# Patient Record
Sex: FEMALE | Race: WHITE | NOT HISPANIC OR LATINO | Employment: FULL TIME | URBAN - METROPOLITAN AREA
[De-identification: names, ages, dates, MRNs, and addresses within clinical notes are randomized per-mention and may not be internally consistent; named-entity substitution may affect disease eponyms.]

---

## 2017-01-24 ENCOUNTER — COMMUNICATION - HEALTHEAST (OUTPATIENT)
Dept: SCHEDULING | Facility: CLINIC | Age: 27
End: 2017-01-24

## 2017-01-24 DIAGNOSIS — L70.0 ACNE VULGARIS: ICD-10-CM

## 2017-01-24 DIAGNOSIS — Z30.41 ORAL CONTRACEPTIVE USE: ICD-10-CM

## 2017-01-26 ENCOUNTER — COMMUNICATION - HEALTHEAST (OUTPATIENT)
Dept: SCHEDULING | Facility: CLINIC | Age: 27
End: 2017-01-26

## 2017-01-26 DIAGNOSIS — Z30.41 ORAL CONTRACEPTIVE USE: ICD-10-CM

## 2017-01-27 ENCOUNTER — COMMUNICATION - HEALTHEAST (OUTPATIENT)
Dept: FAMILY MEDICINE | Facility: CLINIC | Age: 27
End: 2017-01-27

## 2017-01-27 DIAGNOSIS — L70.0 ACNE VULGARIS: ICD-10-CM

## 2017-05-30 ENCOUNTER — COMMUNICATION - HEALTHEAST (OUTPATIENT)
Dept: SCHEDULING | Facility: CLINIC | Age: 27
End: 2017-05-30

## 2017-05-30 DIAGNOSIS — L70.0 ACNE VULGARIS: ICD-10-CM

## 2017-11-07 ENCOUNTER — COMMUNICATION - HEALTHEAST (OUTPATIENT)
Dept: SCHEDULING | Facility: CLINIC | Age: 27
End: 2017-11-07

## 2017-11-07 DIAGNOSIS — L70.0 ACNE VULGARIS: ICD-10-CM

## 2017-12-04 ENCOUNTER — OFFICE VISIT - HEALTHEAST (OUTPATIENT)
Dept: FAMILY MEDICINE | Facility: CLINIC | Age: 27
End: 2017-12-04

## 2017-12-04 DIAGNOSIS — L70.0 ACNE VULGARIS: ICD-10-CM

## 2017-12-04 DIAGNOSIS — Z11.3 SCREEN FOR STD (SEXUALLY TRANSMITTED DISEASE): ICD-10-CM

## 2017-12-04 DIAGNOSIS — Z30.41 ORAL CONTRACEPTIVE USE: ICD-10-CM

## 2017-12-04 DIAGNOSIS — Z13.220 SCREENING CHOLESTEROL LEVEL: ICD-10-CM

## 2017-12-04 DIAGNOSIS — L70.8 OTHER ACNE: ICD-10-CM

## 2017-12-04 LAB — HIV 1+2 AB+HIV1 P24 AG SERPL QL IA: NEGATIVE

## 2017-12-05 LAB
HBV SURFACE AG SERPL QL IA: NEGATIVE
SYPHILIS RPR SCREEN - HISTORICAL: NORMAL

## 2018-03-06 ENCOUNTER — COMMUNICATION - HEALTHEAST (OUTPATIENT)
Dept: SCHEDULING | Facility: CLINIC | Age: 28
End: 2018-03-06

## 2018-03-06 DIAGNOSIS — L70.0 ACNE VULGARIS: ICD-10-CM

## 2019-05-23 ENCOUNTER — AMBULATORY - HEALTHEAST (OUTPATIENT)
Dept: MULTI SPECIALTY CLINIC | Facility: CLINIC | Age: 29
End: 2019-05-23

## 2019-05-23 LAB — PAP SMEAR - HIM PATIENT REPORTED: NORMAL

## 2019-08-15 ENCOUNTER — OFFICE VISIT - HEALTHEAST (OUTPATIENT)
Dept: FAMILY MEDICINE | Facility: CLINIC | Age: 29
End: 2019-08-15

## 2019-08-15 DIAGNOSIS — R10.2 PELVIC PAIN: ICD-10-CM

## 2019-08-15 LAB
ALBUMIN UR-MCNC: NEGATIVE MG/DL
APPEARANCE UR: CLEAR
BACTERIA #/AREA URNS HPF: ABNORMAL HPF
BILIRUB UR QL STRIP: NEGATIVE
COLOR UR AUTO: YELLOW
GLUCOSE UR STRIP-MCNC: NEGATIVE MG/DL
HGB UR QL STRIP: ABNORMAL
KETONES UR STRIP-MCNC: NEGATIVE MG/DL
LEUKOCYTE ESTERASE UR QL STRIP: NEGATIVE
NITRATE UR QL: NEGATIVE
PH UR STRIP: 5.5 [PH] (ref 5–8)
RBC #/AREA URNS AUTO: ABNORMAL HPF
SP GR UR STRIP: >=1.03 (ref 1–1.03)
SQUAMOUS #/AREA URNS AUTO: ABNORMAL LPF
UROBILINOGEN UR STRIP-ACNC: ABNORMAL
WBC #/AREA URNS AUTO: ABNORMAL HPF

## 2019-08-15 ASSESSMENT — MIFFLIN-ST. JEOR: SCORE: 1364.31

## 2019-08-16 ENCOUNTER — HOSPITAL ENCOUNTER (OUTPATIENT)
Dept: ULTRASOUND IMAGING | Facility: CLINIC | Age: 29
Discharge: HOME | End: 2019-08-16
Attending: FAMILY MEDICINE

## 2019-08-16 DIAGNOSIS — R10.2 PELVIC PAIN: ICD-10-CM

## 2019-08-17 LAB — BACTERIA SPEC CULT: NO GROWTH

## 2019-08-22 ENCOUNTER — COMMUNICATION - HEALTHEAST (OUTPATIENT)
Dept: FAMILY MEDICINE | Facility: CLINIC | Age: 29
End: 2019-08-22

## 2019-09-12 ENCOUNTER — OFFICE VISIT (OUTPATIENT)
Dept: FAMILY MEDICINE | Facility: CLINIC | Age: 29
End: 2019-09-12
Payer: COMMERCIAL

## 2019-09-12 VITALS — HEART RATE: 87 BPM | TEMPERATURE: 97.5 F | SYSTOLIC BLOOD PRESSURE: 147 MMHG | DIASTOLIC BLOOD PRESSURE: 95 MMHG

## 2019-09-12 DIAGNOSIS — Z71.84 TRAVEL ADVICE ENCOUNTER: Primary | ICD-10-CM

## 2019-09-12 PROCEDURE — 90471 IMMUNIZATION ADMIN: CPT | Mod: GA | Performed by: NURSE PRACTITIONER

## 2019-09-12 PROCEDURE — 90715 TDAP VACCINE 7 YRS/> IM: CPT | Mod: GA | Performed by: NURSE PRACTITIONER

## 2019-09-12 PROCEDURE — 90717 YELLOW FEVER VACCINE SUBQ: CPT | Mod: GA | Performed by: NURSE PRACTITIONER

## 2019-09-12 PROCEDURE — 99402 PREV MED CNSL INDIV APPRX 30: CPT | Mod: 25 | Performed by: NURSE PRACTITIONER

## 2019-09-12 PROCEDURE — 90691 TYPHOID VACCINE IM: CPT | Mod: GA | Performed by: NURSE PRACTITIONER

## 2019-09-12 PROCEDURE — 90734 MENACWYD/MENACWYCRM VACC IM: CPT | Mod: GA | Performed by: NURSE PRACTITIONER

## 2019-09-12 PROCEDURE — 90472 IMMUNIZATION ADMIN EACH ADD: CPT | Mod: GA | Performed by: NURSE PRACTITIONER

## 2019-09-12 RX ORDER — ATOVAQUONE AND PROGUANIL HYDROCHLORIDE 250; 100 MG/1; MG/1
1 TABLET, FILM COATED ORAL DAILY
Qty: 22 TABLET | Refills: 0 | Status: SHIPPED | OUTPATIENT
Start: 2019-09-12 | End: 2021-11-23

## 2019-09-12 RX ORDER — ATOVAQUONE AND PROGUANIL HYDROCHLORIDE 250; 100 MG/1; MG/1
1 TABLET, FILM COATED ORAL DAILY
Qty: 22 TABLET | Refills: 0 | Status: SHIPPED | OUTPATIENT
Start: 2019-09-12 | End: 2019-09-17

## 2019-09-12 RX ORDER — AZITHROMYCIN 500 MG/1
500 TABLET, FILM COATED ORAL DAILY
Qty: 3 TABLET | Refills: 0 | Status: SHIPPED | OUTPATIENT
Start: 2019-09-12 | End: 2019-09-15

## 2019-09-12 RX ORDER — CLINDAMYCIN PHOSPHATE 10 MG/G
GEL TOPICAL
COMMUNITY
Start: 2017-12-04 | End: 2021-11-23

## 2019-09-12 RX ORDER — LEVONORGESTREL/ETHIN.ESTRADIOL 0.1-0.02MG
1 TABLET ORAL
COMMUNITY
Start: 2019-05-23 | End: 2021-11-23

## 2019-09-12 NOTE — PROGRESS NOTES
Nurse Note      Itinerary:  [facility]         Departure Date: 12/29/2019      Return Date: 1/9/2019      Length of Trip 10-11 days      Reason for Travel: Tourism           Urban or rural: Rural      Accommodations: Hotel        IMMUNIZATION HISTORY  Have you received any immunizations within the past 4 weeks?  No  Have you ever fainted from having your blood drawn or from an injection?  No  Have you ever had a fever reaction to vaccination?  No  Have you ever had any bad reaction or side effect from any vaccination?  No  Have you ever had hepatitis A or B vaccine?  Yes  Do you live (or work closely) with anyone who has AIDS, an AIDS-like condition, any other immune disorder or who is on chemotherapy for cancer?  No  Do you have a family history of immunodeficiency?  No  Have you received any injection of immune globulin or any blood products during the past 12 months?  No    Patient roomed by JUHI Thomas  Iwona Hill is a 29 year old female seen today with spouse for counsultation for international travel to the stated countries.   Patient will be departing in  3.5 month(s) and  traveling with spouse.      Patient itinerary :  will be in the urban region of    which presents risk for Malaria and Yellow Fever. exposure.      Patient's activities will include sightseeing.    Patient's country of birth is [place]    Special medical concerns: none  Pre-travel questionnaire was completed by patient and reviewed by provider.     Vitals: BP (!) 147/95   Pulse 87   Temp 97.5  F (36.4  C) (Oral)   BMI= There is no height or weight on file to calculate BMI.    EXAM:  General:  Well-nourished, well-developed in no acute distress.  Appears to be stated age, interacts appropriately and expresses understanding of information given to patient.    Current Outpatient Medications   Medication Sig Dispense Refill     clindamycin (CLINDAMAX) 1 % external gel        levonorgestrel-ethinyl estradiol  (AVIANE/ALESSE/LESSINA) 0.1-20 MG-MCG tablet Take 1 tablet by mouth       There is no problem list on file for this patient.    No Known Allergies      Immunizations discussed include:   Hepatitis A:  Up to date  Hepatitis B: Up to date per report  Influenza: deferred  Typhoid: Ordered/given today, risks, benefits and side effects reviewed  Rabies: Declined  reviewed managment of a animal bite or scratch (washing wound, seek medical care within 24 hours for post exposure prophylaxis )  Yellow Fever: Stamaril Ordered/given today - consent completed, side effects, precautions, allergies, risks discussed. Patient expressed understanding.  Greenlandic Encephalitis: Not indicated  Meningococcus: Ordered/given today, risks, benefits and side effects reviewed  Tetanus/Diphtheria: Ordered/given today, risks, benefits and side effects reviewed  Measles/Mumps/Rubella: Up to date  Cholera: Not needed  Polio: Up to date  Pneumococcal: Under age of 65  Varicella: Up to date  Zostavax:  Not indicated  Shingrix: Not indicated  HPV:  Not indicated  TB:  Low risk     Stamaril Informed Consent    The patient was provided with a copy of the IRB-approved consent form and all questions were answered before the patient agreed to participate by signing the informed consent document.   A copy of the form was provided to the patient.    Date: 09/12/2019  Consent Version Date: 5/10/2017   Consent Obtained by:  Aspen Salcedo CNP (Lori)     HIPAA:  Yes  HIPAA Authorization Signed Date: 09/12/2019      Inclusion/Exclusion Criteria:    (Similar to Yellow Fever-VAX)      The patient met all of the following inclusion criteria in order to be eligible for the Stamaril vaccination under this EAP (Expanded Access Investigational New Drug Program)           At increased risk for YF, including researchers, laboratory workers, vaccine production staff, and those who are traveling within 30 days to a YF-endemic region or to a country requiring proof of YF  vaccination under IHRs (International Health Regulations)?       Yes     Patient is greater than or equal to 9 months of age on the day of vaccination?     Yes     Patient is greater than or equal to 18 years of age and signed and dated the Consent Forms?     Yes     Patient is < 18 years of age and parent(s)/guardian(s) signed and dated the Consent Forms?      Patient is 7 years to < 18 years of age and signed and dated the Assent form?        No Assent is required.  Patient is <7 years of age.     No      No      N/A     The patient did not meet any of the following criteria that would have excluded the patient from receiving the Stamaril vaccination under this EAP              Patient is less than 9 months of age.       No     The patient is breast-feeding and cannot stop nursing for at least 14 days after vaccination.    Note: Yellow Fever vaccine virus may be transmissible via breast milk by nursing mothers who are vaccinated during the final 2 weeks of pregnancy or post-partum.   Following transmission, infants may develop encephalitis.  The minimum time of discontinuation of breastfeeding for 14 days after vaccination is based on the expected clearance of live-attenuated vaccine virus.       No     The patient is immunosuppressed, whether congenital or idiopathic, including for example, leukemia, lymphoma, other malignancies, and patients who are receiving immunosuppressant medications (e.g. Systemic corticosteroids [greater than the standard dose of topical or inhaled steroids], alkylating drugs, antimetabolites, of other cytotoxic or immunomodulatory drugs) or radiation therapy or organ transplantation.       No     The patient has known hypersensitivity to the active substance or to any of the excipients of Stamaril vaccine or to eggs or chicken proteins.     No     The patient is symptomatic for human immunodeficiency virus (HIV) infection     No     The patient is asymptomatic for HIV infection but  accompanied by evidence of severe immune suppression    Note:  Evidence of severe immune suppression includes CD4+ T-cell counts < 200 cubic millimeters (or < 15% total lymphocytes in children aged < 6 years), or as determined by the health care provider.       No     The patient has a history of thymus dysfunction (including myasthenia gravis, thymoma, thymectomy)     No     Moderate or severe febrile illness or acute illness    Note: Participation in the EAP can be reassessed when moderate or severe febrile illness or acute illness has resolved.       No           Altitude Exposure on this trip: no  Past tolerance to Altitude: na    ASSESSMENT/PLAN:    ICD-10-CM    1. Travel advice encounter Z71.89 azithromycin (ZITHROMAX) 500 MG tablet     atovaquone-proguanil (MALARONE) 250-100 MG tablet     azithromycin (ZITHROMAX) 500 MG tablet     DISCONTINUED: atovaquone-proguanil (MALARONE) 250-100 MG tablet     I have reviewed general recommendations for safe travel   including: food/water precautions, insect precautions, safer sex   practices given high prevalence of Zika, HIV and other STDs,   roadway safety. Educational materials and [employer] report provided.    Malaraia prophylaxis recommended: Malarone  Symptomatic treatment for traveler's diarrhea: azithromycin  Altitude illness prevention and treatment: none      Evacuation insurance advised and resources were provided to patient.    Total visit time 30 minutes  with over 50% of time spent counseling patient as detailed above.    Aspen Salcedo CNP

## 2019-09-12 NOTE — PATIENT INSTRUCTIONS
Today September 12, 2019 you received the    Yellow Fever (YF)    Tetanus (Tdap) Vaccine    Meningococcal (Menactra) Vaccine    Typhoid - injectable. This vaccine is valid for two years.   .    These appointments can be made as a NURSE ONLY visit.    **It is very important for the vaccinations to be given on the scheduled day(s), this helps ensure you receive the full effectiveness of the vaccine.**    Please call [facility] with any questions [phone]    Thank you for visiting [facility]

## 2020-01-29 NOTE — TELEPHONE ENCOUNTER
RECORDS RECEIVED FROM: Self   Date of Appt: 2/27/20   NOTES (FOR ALL VISITS) STATUS DETAILS   OFFICE NOTE from referring provider N/A    OFFICE NOTE from other specialist Care Everywhere Yeny BOJORQUEZ @ [facility]:  10/28/19   DISCHARGE SUMMARY from hospital N/A    DISCHARGE REPORT from the ER N/A    OPERATIVE REPORT N/A    MEDICATION LIST Care Everywhere    IMAGING  (FOR ALL VISITS)     EMG N/A    EEG N/A    MRI (HEAD, NECK, SPINE) N/A    LUMBAR PUNCTURE N/A    LANCE Scan N/A    CT (HEAD, NECK, SPINE) N/A

## 2020-02-27 ENCOUNTER — OFFICE VISIT (OUTPATIENT)
Dept: NEUROLOGY | Facility: CLINIC | Age: 30
End: 2020-02-27
Payer: COMMERCIAL

## 2020-02-27 ENCOUNTER — PRE VISIT (OUTPATIENT)
Dept: NEUROLOGY | Facility: CLINIC | Age: 30
End: 2020-02-27

## 2020-02-27 VITALS
WEIGHT: 148.4 LBS | SYSTOLIC BLOOD PRESSURE: 149 MMHG | OXYGEN SATURATION: 100 % | HEART RATE: 79 BPM | DIASTOLIC BLOOD PRESSURE: 91 MMHG

## 2020-02-27 DIAGNOSIS — G44.86 CERVICOGENIC HEADACHE: ICD-10-CM

## 2020-02-27 DIAGNOSIS — G44.209 TENSION HEADACHE: Primary | ICD-10-CM

## 2020-02-27 RX ORDER — GABAPENTIN 300 MG/1
300 CAPSULE ORAL AT BEDTIME
Qty: 90 CAPSULE | Refills: 1 | Status: SHIPPED | OUTPATIENT
Start: 2020-02-27 | End: 2021-11-23

## 2020-02-27 ASSESSMENT — ENCOUNTER SYMPTOMS
JOINT SWELLING: 0
DIZZINESS: 1
TREMORS: 0
NAUSEA: 0
ARTHRALGIAS: 0
FEVER: 0
ABDOMINAL PAIN: 1
TASTE DISTURBANCE: 0
DECREASED APPETITE: 0
JAUNDICE: 0
MUSCLE WEAKNESS: 0
LIGHT-HEADEDNESS: 1
NIGHT SWEATS: 0
SORE THROAT: 0
POLYPHAGIA: 0
HEADACHES: 1
HOARSE VOICE: 0
INSOMNIA: 1
SLEEP DISTURBANCES DUE TO BREATHING: 0
DISTURBANCES IN COORDINATION: 0
ORTHOPNEA: 0
DEPRESSION: 1
MEMORY LOSS: 0
PALPITATIONS: 0
SMELL DISTURBANCE: 0
SEIZURES: 0
POLYDIPSIA: 0
MYALGIAS: 0
STIFFNESS: 0
LEG PAIN: 0
HYPOTENSION: 0
WEIGHT GAIN: 0
CHILLS: 0
BACK PAIN: 1
TINGLING: 1
RECTAL PAIN: 0
INCREASED ENERGY: 1
PARALYSIS: 0
SPEECH CHANGE: 0
BLOOD IN STOOL: 1
BOWEL INCONTINENCE: 0
VOMITING: 0
EXERCISE INTOLERANCE: 0
CONSTIPATION: 1
NECK MASS: 0
NUMBNESS: 0
HALLUCINATIONS: 0
BLOATING: 0
LOSS OF CONSCIOUSNESS: 0
TROUBLE SWALLOWING: 0
DIARRHEA: 0
DECREASED CONCENTRATION: 0
SINUS CONGESTION: 0
HYPERTENSION: 1
SYNCOPE: 0
WEAKNESS: 0
WEIGHT LOSS: 0
SINUS PAIN: 0
FATIGUE: 1
HEARTBURN: 0
ALTERED TEMPERATURE REGULATION: 0
NECK PAIN: 1
PANIC: 1
NERVOUS/ANXIOUS: 1
MUSCLE CRAMPS: 0

## 2020-02-27 ASSESSMENT — PAIN SCALES - GENERAL: PAINLEVEL: MODERATE PAIN (4)
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
## 2020-02-27 NOTE — NURSING NOTE
Chief Complaint   Patient presents with     Consult     [facility] NEW - RIGHT SIDE NECK PAIN; HEADACHES       Stan Casanova, EMT

## 2020-02-27 NOTE — PATIENT INSTRUCTIONS
Thanks for visiting the clinic today. Your recommendations are below.     1. Please be sure to schedule your brain MRI     2. Please start taking 300mg daily Gabapentin in the evening    3. Please be sure to start physical therapy.
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
## 2020-03-05 ENCOUNTER — THERAPY VISIT (OUTPATIENT)
Dept: PHYSICAL THERAPY | Facility: CLINIC | Age: 30
End: 2020-03-05
Attending: PSYCHIATRY & NEUROLOGY
Payer: COMMERCIAL

## 2020-03-05 ENCOUNTER — TELEPHONE (OUTPATIENT)
Dept: NEUROLOGY | Facility: CLINIC | Age: 30
End: 2020-03-05

## 2020-03-05 DIAGNOSIS — M54.2 NECK PAIN: Primary | ICD-10-CM

## 2020-03-05 DIAGNOSIS — G44.209 TENSION HEADACHE: ICD-10-CM

## 2020-03-05 PROCEDURE — 97110 THERAPEUTIC EXERCISES: CPT | Mod: GP | Performed by: PHYSICAL THERAPIST

## 2020-03-05 PROCEDURE — 97161 PT EVAL LOW COMPLEX 20 MIN: CPT | Mod: GP | Performed by: PHYSICAL THERAPIST

## 2020-03-05 NOTE — TELEPHONE ENCOUNTER
Pt called to ask if her MRI this Sunday was brain or brain and cervical spine. I clarified it was just for brain. She asked if cervical spine could be added. I let her know I will run this by the providers and get back to her.     Susannah VEGAS

## 2020-03-05 NOTE — PROGRESS NOTES
"[facility] Initial Evaluation  HPI  SUBJECTIVE  Iwona Hill is a 29 year old female patient presenting to Physical Therapy with the following Primary Symptoms: {RIGHT, LEFT-INITIAL CAP:952293} ***. {He/She:739718} denies having related symptoms spreading distally or proximally from the site of complaints. {He/She:492198} denies having severe night pain, peripheral motor deficit, changes in vision, ringing in the ears, painful swallowing, and worsening headaches/migraines.     History of symptoms: Headaches began to worsen around 6 weeks ago. ***. These pains are described as {constant or intermittent:917734}. Pain is rated ***/10 at rest, and ***/10 at worst. Patient describes pain as {PAIN quality:941509::\"cramping\"}.     Previous treatment has included {PREVIOUS TREATMENT Shoulder:695238}. Patient notes that prior treatment has resulted in ***. Since onset, these pains are {BETTER:550701071}.     Current Symptoms are worsened by: ***.     Current Symptoms are relieved by ***.     Patient states that current complaints {ARE:921877} affecting sleep pattern, with *** wakings per night and *** hours of sleep lost on average.     Recent surgical procedure: *** performed on {DATE/MONTH/YEAR:109891}.     Recent imaging studies {HAVE:195511} been performed and results showed: ***.     General health as reported by patient is: {EXCELLENT. GOOD. FAIR, POOR:618856}. Pertinent medical history: ***. {He/She:575993} denies any significant current illness or recent hospital admissions. {He/She:626051} denies any regonal implanted devices.     Current occupational status: ***. Occupational duties include: ***. Previous functional status: fully functional prior to pain onset/injury.    PATIENT GOALS: ***      OBJECTIVE    STATIC POSTURE: ***  -This patient {WAS / WAS NOT:262818} able to remain comfortably seated throughout exam.    TRANSFERS/TRANSITIONAL MOVEMENTS: ***      MOTOR CONTROL:  -Lower trap " "activation/endurance: ***  -Serratus activation: {QUALITY:9294903}  -Deep neck flexor activation/endurance: ***    ROM:  Flexion ***   Extension ***   Sidebend left ***   Sidebend right ***   Rotation left ***   Rotation right ***   *Denotes pain    OVERPRESSURE: ***      UE NEURO SCREEN    Myotomes:   Left Right   Shoulder shrug (C4) *** ***   Shoulder abduction (C5) *** ***   Elbow flexion (C6) *** ***   Elbow extension (C7) *** ***   Finger flexion (C8) *** ***   Finger adduction (T1) *** ***     Dermatomes: C4-T1 dermatomes {INTACT DIMINISHED:885965} to light touch bilaterally.    Reflexes: ***     strength: ***      SEGMENTAL MOBILITY:  -PA spring test: Patient was {RW PT CAPSULAR MOBILITY:202252::\"Normal\"} through segments ***.  -Downglide mobility: ***    PALPATION: ***      SHOULDER SCREEN:  -ROM: {RW PT CAPSULAR MOBILITY:888394::\"Normal\"}  -Other relevant findings: ***      Cervical Special Tests   Left Right   Sharp-Pursor test     Spurling's     ULTT - Ulnar nerve     ULTT - Median nerve     ULTT - Radial nerve     Cervical distraction     NT=Not Tested        REPEATED MOTIONS:  Motion Response (worse/no worse/better)   Retraction    Retraction x10    Retraction with extension    Retraction with extension x10        Therapist Assessment: Iwona Hill is a 29 year old female patient presenting to Physical Therapy with ***. Patient demonstrates ***. Signs and symptoms are consistent with ***. These impairments limit their ability to ***. Skilled PT services are necessary in order to reduce impairments and improve independent function.    System    Physical Exam    General     ROS    Assessment/Plan:    Patient is a 29 year old female with cervical complaints.    Patient has the following significant findings with corresponding treatment plan.                Diagnosis 1:  ***    {:074236}    Therapy Evaluation Codes:   1) History comprised of:   Personal factors that impact the plan of care:  "     {Personal factors impacting care:494577}.    Comorbidity factors that impact the plan of care are:      {Comorbidities impacting care:388683}.     Medications impacting care: {Medications Impacting care:225744}.  2) Examination of Body Systems comprised of:   Body structures and functions that impact the plan of care:      {Body Structures and functions:110957}.   Activity limitations that impact the plan of care are:      {Activity/participation limitations or restrictions:194431}.  3) Clinical presentation characteristics are:   {Clinical presentation characteristics:708768}.  4) Decision-Making    {Decision Making Low/Moderate/High:452480}  Cumulative Therapy Evaluation is: {Low/Moderate/High/Re-evaluation complexity:349065}.    Previous and current functional limitations:  (See Goal Flow Sheet for this information)    Short term and Long term goals: (See Goal Flow Sheet for this information)     Communication ability:  Patient appears to be able to clearly communicate and understand verbal and written communication and follow directions correctly.  Treatment Explanation - The following has been discussed with the patient:   RX ordered/plan of care  Anticipated outcomes  Possible risks and side effects  This patient would benefit from PT intervention to resume normal activities.   Rehab potential is {REHAB POTENTIAL/PROGNOSIS:163655}.    Frequency:  {FREQUENCY OF TREATMENT:023798}  Duration:  {DURATION OF TREATMENT:819763}  Discharge Plan:  Achieve all LTG.  Independent in home treatment program.  Reach maximal therapeutic benefit.    Please refer to the daily flowsheet for treatment today, total treatment time and time spent performing 1:1 timed codes.

## 2020-03-05 NOTE — TELEPHONE ENCOUNTER
[facility] Call Center    Phone Message    May a detailed message be left on voicemail: yes     Reason for Call: Other: pt has an MRI scheduled for this sunday, she is wondering if its just a scan of her brain, or her neck as well, please call pt thanks     Action Taken: Message routed to:  [facility] neuro    Travel Screening: Not Applicable

## 2020-03-06 ENCOUNTER — TELEPHONE (OUTPATIENT)
Dept: NEUROLOGY | Facility: CLINIC | Age: 30
End: 2020-03-06
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
## 2020-03-06 NOTE — TELEPHONE ENCOUNTER
"I called pt and left message answering question from yesterday regarding MRI. Per Dr. Rendon, \"Plan is just Brain MRI right now.  I know she has some neck pain as well but with exam without weakness, reflex asymmetry or sensory changes I do not see indication to order Cervical spine  MRI.  If she does not respond to treatment or physical therapy then could consider Cervical MRI on follow up, but suspect it is muscle tension for which MRI would not be helpful anyway.\"    Susannah VEGAS  "

## 2020-03-08 ENCOUNTER — ANCILLARY PROCEDURE (OUTPATIENT)
Dept: MRI IMAGING | Facility: CLINIC | Age: 30
End: 2020-03-08
Attending: PSYCHIATRY & NEUROLOGY
Payer: COMMERCIAL

## 2020-03-08 DIAGNOSIS — G44.209 TENSION HEADACHE: ICD-10-CM

## 2020-06-10 PROBLEM — G44.209 TENSION HEADACHE: Status: RESOLVED | Noted: 2020-03-05 | Resolved: 2020-06-10

## 2020-06-10 PROBLEM — M54.2 NECK PAIN: Status: RESOLVED | Noted: 2020-03-05 | Resolved: 2020-06-10

## 2020-06-10 NOTE — PROGRESS NOTES
DISCHARGE REPORT    Patient seen one time for evaluation and treat. Please see initial evaluation for discharge information. Episode to be closed at this time and patient formally discharged from therapy.      Donte Isaac, PT, DPT, OCS

## 2020-07-13 ENCOUNTER — COMMUNICATION - HEALTHEAST (OUTPATIENT)
Dept: FAMILY MEDICINE | Facility: CLINIC | Age: 30
End: 2020-07-13

## 2020-08-06 ENCOUNTER — PRENATAL OFFICE VISIT - HEALTHEAST (OUTPATIENT)
Dept: FAMILY MEDICINE | Facility: CLINIC | Age: 30
End: 2020-08-06

## 2020-08-06 ENCOUNTER — RECORDS - HEALTHEAST (OUTPATIENT)
Dept: ADMINISTRATIVE | Facility: OTHER | Age: 30
End: 2020-08-06

## 2020-08-06 DIAGNOSIS — Z3A.10 10 WEEKS GESTATION OF PREGNANCY: ICD-10-CM

## 2020-08-06 LAB
BASOPHILS # BLD AUTO: 0 THOU/UL (ref 0–0.2)
BASOPHILS NFR BLD AUTO: 0 % (ref 0–2)
EOSINOPHIL # BLD AUTO: 0.1 THOU/UL (ref 0–0.4)
EOSINOPHIL NFR BLD AUTO: 1 % (ref 0–6)
ERYTHROCYTE [DISTWIDTH] IN BLOOD BY AUTOMATED COUNT: 12.8 % (ref 11–14.5)
HCT VFR BLD AUTO: 36.8 % (ref 35–47)
HGB BLD-MCNC: 11.7 G/DL (ref 12–16)
HIV 1+2 AB+HIV1 P24 AG SERPL QL IA: NEGATIVE
LYMPHOCYTES # BLD AUTO: 2.3 THOU/UL (ref 0.8–4.4)
LYMPHOCYTES NFR BLD AUTO: 29 % (ref 20–40)
MCH RBC QN AUTO: 28.5 PG (ref 27–34)
MCHC RBC AUTO-ENTMCNC: 31.8 G/DL (ref 32–36)
MCV RBC AUTO: 90 FL (ref 80–100)
MONOCYTES # BLD AUTO: 0.5 THOU/UL (ref 0–0.9)
MONOCYTES NFR BLD AUTO: 6 % (ref 2–10)
NEUTROPHILS # BLD AUTO: 4.9 THOU/UL (ref 2–7.7)
NEUTROPHILS NFR BLD AUTO: 63 % (ref 50–70)
PLATELET # BLD AUTO: 168 THOU/UL (ref 140–440)
PMV BLD AUTO: 10.5 FL (ref 8.5–12.5)
RBC # BLD AUTO: 4.1 MILL/UL (ref 3.8–5.4)
WBC: 7.8 THOU/UL (ref 4–11)

## 2020-08-07 ENCOUNTER — AMBULATORY - HEALTHEAST (OUTPATIENT)
Dept: LAB | Facility: CLINIC | Age: 30
End: 2020-08-07

## 2020-08-07 ENCOUNTER — COMMUNICATION - HEALTHEAST (OUTPATIENT)
Dept: FAMILY MEDICINE | Facility: CLINIC | Age: 30
End: 2020-08-07

## 2020-08-07 DIAGNOSIS — Z3A.10 10 WEEKS GESTATION OF PREGNANCY: ICD-10-CM

## 2020-08-07 LAB
ABO/RH(D): NORMAL
ABORH REPEAT: NORMAL
ALBUMIN UR-MCNC: NEGATIVE MG/DL
ANTIBODY SCREEN: NEGATIVE
APPEARANCE UR: CLEAR
BILIRUB UR QL STRIP: NEGATIVE
C TRACH DNA SPEC QL PROBE+SIG AMP: NEGATIVE
COLOR UR AUTO: YELLOW
GLUCOSE UR STRIP-MCNC: NEGATIVE MG/DL
HBV SURFACE AG SERPL QL IA: NEGATIVE
HGB UR QL STRIP: NEGATIVE
KETONES UR STRIP-MCNC: NEGATIVE MG/DL
LEUKOCYTE ESTERASE UR QL STRIP: NEGATIVE
N GONORRHOEA DNA SPEC QL NAA+PROBE: NEGATIVE
NITRATE UR QL: NEGATIVE
PH UR STRIP: 6 [PH] (ref 5–8)
RUBV IGG SERPL QL IA: POSITIVE
SP GR UR STRIP: 1.01 (ref 1–1.03)
T PALLIDUM AB SER QL: NEGATIVE
UROBILINOGEN UR STRIP-ACNC: NORMAL

## 2020-08-08 LAB — BACTERIA SPEC CULT: NO GROWTH

## 2020-08-12 ENCOUNTER — COMMUNICATION - HEALTHEAST (OUTPATIENT)
Dept: FAMILY MEDICINE | Facility: CLINIC | Age: 30
End: 2020-08-12

## 2020-08-12 DIAGNOSIS — Z3A.10 10 WEEKS GESTATION OF PREGNANCY: ICD-10-CM

## 2020-08-19 ENCOUNTER — COMMUNICATION - HEALTHEAST (OUTPATIENT)
Dept: SCHEDULING | Facility: CLINIC | Age: 30
End: 2020-08-19

## 2020-08-19 ENCOUNTER — HOSPITAL ENCOUNTER (OUTPATIENT)
Dept: ULTRASOUND IMAGING | Facility: CLINIC | Age: 30
Discharge: HOME | End: 2020-08-19
Attending: FAMILY MEDICINE

## 2020-08-19 ENCOUNTER — PRENATAL OFFICE VISIT - HEALTHEAST (OUTPATIENT)
Dept: FAMILY MEDICINE | Facility: CLINIC | Age: 30
End: 2020-08-19

## 2020-08-19 DIAGNOSIS — O20.9 FIRST TRIMESTER BLEEDING: ICD-10-CM

## 2020-08-19 DIAGNOSIS — N93.9 VAGINAL SPOTTING: ICD-10-CM

## 2020-08-19 LAB
ALBUMIN UR-MCNC: NEGATIVE MG/DL
APPEARANCE UR: CLEAR
BILIRUB UR QL STRIP: NEGATIVE
COLOR UR AUTO: YELLOW
GLUCOSE UR STRIP-MCNC: NEGATIVE MG/DL
HGB UR QL STRIP: NEGATIVE
KETONES UR STRIP-MCNC: NEGATIVE MG/DL
LEUKOCYTE ESTERASE UR QL STRIP: NEGATIVE
NITRATE UR QL: NEGATIVE
PH UR STRIP: 5.5 [PH] (ref 5–8)
SP GR UR STRIP: >=1.03 (ref 1–1.03)
UROBILINOGEN UR STRIP-ACNC: NORMAL

## 2020-08-20 LAB — BACTERIA SPEC CULT: NO GROWTH

## 2020-09-08 ENCOUNTER — COMMUNICATION - HEALTHEAST (OUTPATIENT)
Dept: FAMILY MEDICINE | Facility: CLINIC | Age: 30
End: 2020-09-08

## 2020-09-17 ENCOUNTER — PRENATAL OFFICE VISIT - HEALTHEAST (OUTPATIENT)
Dept: FAMILY MEDICINE | Facility: CLINIC | Age: 30
End: 2020-09-17

## 2020-09-17 DIAGNOSIS — L70.0 ACNE VULGARIS: ICD-10-CM

## 2020-09-17 DIAGNOSIS — Z3A.16 16 WEEKS GESTATION OF PREGNANCY: ICD-10-CM

## 2020-09-20 LAB
# FETUSES US: NORMAL
AFP MOM SERPL: 1.75
AFP SERPL-MCNC: 62 NG/ML
AGE - REPORTED: 29 YR
CURRENT SMOKER: NO
FAMILY MEMBER DISEASES HX: NO
GA METHOD: NORMAL
GA: NORMAL WK
IDDM PATIENT QL: NO
INTEGRATED SCN PATIENT-IMP: NORMAL
SPECIMEN DRAWN SERPL: NORMAL

## 2020-10-14 ENCOUNTER — HOSPITAL ENCOUNTER (OUTPATIENT)
Dept: ULTRASOUND IMAGING | Facility: CLINIC | Age: 30
Discharge: HOME | End: 2020-10-14
Attending: FAMILY MEDICINE

## 2020-10-14 DIAGNOSIS — Z3A.10 10 WEEKS GESTATION OF PREGNANCY: ICD-10-CM

## 2020-10-19 ENCOUNTER — PRENATAL OFFICE VISIT - HEALTHEAST (OUTPATIENT)
Dept: FAMILY MEDICINE | Facility: CLINIC | Age: 30
End: 2020-10-19

## 2020-10-19 DIAGNOSIS — Z34.90 PREGNANCY, UNSPECIFIED GESTATIONAL AGE: ICD-10-CM

## 2020-11-12 ENCOUNTER — PRENATAL OFFICE VISIT - HEALTHEAST (OUTPATIENT)
Dept: FAMILY MEDICINE | Facility: CLINIC | Age: 30
End: 2020-11-12

## 2020-11-12 DIAGNOSIS — O44.40 LOW-LYING PLACENTA: ICD-10-CM

## 2020-11-18 ENCOUNTER — COMMUNICATION - HEALTHEAST (OUTPATIENT)
Dept: FAMILY MEDICINE | Facility: CLINIC | Age: 30
End: 2020-11-18

## 2020-12-03 ENCOUNTER — PRENATAL OFFICE VISIT - HEALTHEAST (OUTPATIENT)
Dept: FAMILY MEDICINE | Facility: CLINIC | Age: 30
End: 2020-12-03

## 2020-12-03 ENCOUNTER — COMMUNICATION - HEALTHEAST (OUTPATIENT)
Dept: SCHEDULING | Facility: CLINIC | Age: 30
End: 2020-12-03

## 2020-12-03 DIAGNOSIS — O23.42 URINARY TRACT INFECTION IN MOTHER DURING SECOND TRIMESTER OF PREGNANCY: ICD-10-CM

## 2020-12-04 ENCOUNTER — COMMUNICATION - HEALTHEAST (OUTPATIENT)
Dept: FAMILY MEDICINE | Facility: CLINIC | Age: 30
End: 2020-12-04

## 2020-12-04 ENCOUNTER — HOSPITAL ENCOUNTER (OUTPATIENT)
Dept: ULTRASOUND IMAGING | Facility: CLINIC | Age: 30
Discharge: HOME | End: 2020-12-04
Attending: FAMILY MEDICINE

## 2020-12-04 ENCOUNTER — AMBULATORY - HEALTHEAST (OUTPATIENT)
Dept: FAMILY MEDICINE | Facility: CLINIC | Age: 30
End: 2020-12-04

## 2020-12-04 ENCOUNTER — OFFICE VISIT - HEALTHEAST (OUTPATIENT)
Dept: FAMILY MEDICINE | Facility: CLINIC | Age: 30
End: 2020-12-04

## 2020-12-04 DIAGNOSIS — O44.40 LOW-LYING PLACENTA: ICD-10-CM

## 2020-12-04 DIAGNOSIS — O23.10 ACUTE CYSTITIS IN PREGNANCY, ANTEPARTUM: ICD-10-CM

## 2020-12-10 ENCOUNTER — PRENATAL OFFICE VISIT - HEALTHEAST (OUTPATIENT)
Dept: FAMILY MEDICINE | Facility: CLINIC | Age: 30
End: 2020-12-10

## 2020-12-10 DIAGNOSIS — Z3A.28 28 WEEKS GESTATION OF PREGNANCY: ICD-10-CM

## 2020-12-10 LAB
FASTING STATUS PATIENT QL REPORTED: NO
GLUCOSE 1H P 50 G GLC PO SERPL-MCNC: 80 MG/DL (ref 70–139)
HGB BLD-MCNC: 11.5 G/DL (ref 12–16)

## 2021-01-05 ENCOUNTER — PRENATAL OFFICE VISIT - HEALTHEAST (OUTPATIENT)
Dept: FAMILY MEDICINE | Facility: CLINIC | Age: 31
End: 2021-01-05

## 2021-01-05 DIAGNOSIS — N64.52 BLOODY DISCHARGE FROM LEFT NIPPLE: ICD-10-CM

## 2021-01-12 ENCOUNTER — RECORDS - HEALTHEAST (OUTPATIENT)
Dept: ULTRASOUND IMAGING | Facility: CLINIC | Age: 31
End: 2021-01-12

## 2021-01-12 DIAGNOSIS — N64.52 NIPPLE DISCHARGE: ICD-10-CM

## 2021-01-14 ENCOUNTER — PRENATAL OFFICE VISIT - HEALTHEAST (OUTPATIENT)
Dept: FAMILY MEDICINE | Facility: CLINIC | Age: 31
End: 2021-01-14

## 2021-01-14 DIAGNOSIS — Z3A.33 33 WEEKS GESTATION OF PREGNANCY: ICD-10-CM

## 2021-01-27 ENCOUNTER — COMMUNICATION - HEALTHEAST (OUTPATIENT)
Dept: FAMILY MEDICINE | Facility: CLINIC | Age: 31
End: 2021-01-27

## 2021-01-28 ENCOUNTER — PRENATAL OFFICE VISIT - HEALTHEAST (OUTPATIENT)
Dept: FAMILY MEDICINE | Facility: CLINIC | Age: 31
End: 2021-01-28

## 2021-01-28 DIAGNOSIS — Z3A.35 35 WEEKS GESTATION OF PREGNANCY: ICD-10-CM

## 2021-01-29 ENCOUNTER — AMBULATORY - HEALTHEAST (OUTPATIENT)
Dept: FAMILY MEDICINE | Facility: CLINIC | Age: 31
End: 2021-01-29

## 2021-01-29 DIAGNOSIS — N64.52 BLOODY DISCHARGE FROM LEFT NIPPLE: ICD-10-CM

## 2021-02-11 ENCOUNTER — PRENATAL OFFICE VISIT - HEALTHEAST (OUTPATIENT)
Dept: FAMILY MEDICINE | Facility: CLINIC | Age: 31
End: 2021-02-11

## 2021-02-11 DIAGNOSIS — Z3A.36 36 WEEKS GESTATION OF PREGNANCY: ICD-10-CM

## 2021-02-12 LAB
ALLERGIC TO PENICILLIN: NO
GP B STREP DNA SPEC QL NAA+PROBE: NEGATIVE

## 2021-02-15 ENCOUNTER — RECORDS - HEALTHEAST (OUTPATIENT)
Dept: ADMINISTRATIVE | Facility: OTHER | Age: 31
End: 2021-02-15

## 2021-02-18 ENCOUNTER — PRENATAL OFFICE VISIT - HEALTHEAST (OUTPATIENT)
Dept: FAMILY MEDICINE | Facility: CLINIC | Age: 31
End: 2021-02-18

## 2021-02-18 DIAGNOSIS — L70.0 ACNE VULGARIS: ICD-10-CM

## 2021-02-25 ENCOUNTER — PRENATAL OFFICE VISIT - HEALTHEAST (OUTPATIENT)
Dept: FAMILY MEDICINE | Facility: CLINIC | Age: 31
End: 2021-02-25

## 2021-02-25 DIAGNOSIS — Z34.90 PREGNANCY, UNSPECIFIED GESTATIONAL AGE: ICD-10-CM

## 2021-03-01 ENCOUNTER — HOSPITAL ENCOUNTER (OUTPATIENT)
Dept: OBGYN | Facility: HOSPITAL | Age: 31
Discharge: HOME | End: 2021-03-01
Attending: FAMILY MEDICINE | Admitting: FAMILY MEDICINE

## 2021-03-01 ENCOUNTER — COMMUNICATION - HEALTHEAST (OUTPATIENT)
Dept: FAMILY MEDICINE | Facility: CLINIC | Age: 31
End: 2021-03-01

## 2021-03-01 LAB — RUPTURE OF FETAL MEMBRANES BY ROM PLUS: NEGATIVE

## 2021-03-01 ASSESSMENT — MIFFLIN-ST. JEOR: SCORE: 1551.42

## 2021-03-03 ENCOUNTER — COMMUNICATION - HEALTHEAST (OUTPATIENT)
Dept: OBGYN | Facility: CLINIC | Age: 31
End: 2021-03-03

## 2021-03-03 ASSESSMENT — MIFFLIN-ST. JEOR: SCORE: 1551.42

## 2021-03-04 ENCOUNTER — SURGERY - HEALTHEAST (OUTPATIENT)
Dept: OBGYN | Facility: CLINIC | Age: 31
End: 2021-03-04

## 2021-03-04 ENCOUNTER — COMMUNICATION - HEALTHEAST (OUTPATIENT)
Dept: SCHEDULING | Facility: CLINIC | Age: 31
End: 2021-03-04

## 2021-03-04 ENCOUNTER — ANESTHESIA - HEALTHEAST (OUTPATIENT)
Dept: OBGYN | Facility: CLINIC | Age: 31
End: 2021-03-04

## 2021-03-06 ENCOUNTER — COMMUNICATION - HEALTHEAST (OUTPATIENT)
Dept: SCHEDULING | Facility: CLINIC | Age: 31
End: 2021-03-06

## 2021-03-19 ENCOUNTER — OFFICE VISIT - HEALTHEAST (OUTPATIENT)
Dept: FAMILY MEDICINE | Facility: CLINIC | Age: 31
End: 2021-03-19

## 2021-03-19 DIAGNOSIS — N61.0 MASTITIS: ICD-10-CM

## 2021-04-15 ENCOUNTER — OFFICE VISIT - HEALTHEAST (OUTPATIENT)
Dept: FAMILY MEDICINE | Facility: CLINIC | Age: 31
End: 2021-04-15

## 2021-04-15 DIAGNOSIS — Z30.011 BCP (BIRTH CONTROL PILLS) INITIATION: ICD-10-CM

## 2021-04-15 ASSESSMENT — MIFFLIN-ST. JEOR: SCORE: 1420.16

## 2021-04-15 ASSESSMENT — EDINBURGH POSTNATAL DEPRESSION SCALE (EPDS): TOTAL SCORE: 3

## 2021-04-16 LAB
BKR LAB AP ABNORMAL BLEEDING: NO
BKR LAB AP BIRTH CONTROL/HORMONES: NORMAL
BKR LAB AP CERVICAL APPEARANCE: NORMAL
BKR LAB AP GYN ADEQUACY: NORMAL
BKR LAB AP GYN INTERPRETATION: NORMAL
BKR LAB AP HPV REFLEX: NORMAL
BKR LAB AP LMP: NORMAL
BKR LAB AP PATIENT STATUS: NORMAL
BKR LAB AP PREVIOUS ABNORMAL: NO
BKR LAB AP PREVIOUS NORMAL: 2016
HIGH RISK?: NO
PATH REPORT.COMMENTS IMP SPEC: NORMAL
RESULT FLAG (HE HISTORICAL CONVERSION): NORMAL

## 2021-05-10 ENCOUNTER — COMMUNICATION - HEALTHEAST (OUTPATIENT)
Dept: FAMILY MEDICINE | Facility: CLINIC | Age: 31
End: 2021-05-10

## 2021-05-31 VITALS — WEIGHT: 138.2 LBS | BODY MASS INDEX: 21.97 KG/M2

## 2021-05-31 NOTE — PROGRESS NOTES
Please speak to pt(no VM) and tell her sono of pelvis normal.  May represent endometriosis and I'd like her to visit with her OB in [place], [place].

## 2021-05-31 NOTE — PROGRESS NOTES
"Chief Complaint   Patient presents with     Abdominal Pain     Pt c/o L side abdominal pain, Seen in ED in July.     Diarrhea     Pt c/o diarrhea x 3 days       HPI: 29-year-old female, with a past medical history of parotid gland infections and tonsillectomy, presents today for evaluation of lower pelvic pain.  She states that she has had mild left-sided pelvic pain since July.  Review of old notes from 2019 shows that she was seen in the [facility] emergency room, CT scan of the abdomen was performed showing only mild nodules but no evidence of acute process.  White count was 10,900 and she had a strong positive UA and was treated with Keflex.  Since that time she has not had symptoms but she did not have symptoms prior to coming to the emergency room either.  Her symptoms are somewhat vague.  She is currently menstruating.  She takes oral contraceptives for birth control.  She notes occasional mild diarrhea.  Last Pap smear was 2 months ago and was described as normal.  She is a  0 para 0.    ROS: 10 point system review notable for tonsillitis, occasional headaches and dizziness and diarrhea.    SH:     She does not smoke and she is  she works as a .  She does not use illicit drugs.     FH: The Patient's family history is not on file.     Meds:  Shannon has a current medication list which includes the following prescription(s): clindamycin.    O:  /64   Pulse 75   Temp 98  F (36.7  C)   Resp 16   Ht 5' 5.5\" (1.664 m)   Wt 139 lb (63 kg)   SpO2 100%   BMI 22.78 kg/m    Constitutional:    --Vitals as above  --No acute distress  Eyes-  --Sclera noninjected  --Lids and conjunctiva normal  ENT-  --TMs clear  --Sclera noninjected  --Pharynx not erythematous  Neck-  --Neck supple with no cervical lymphadenopathy  Lungs-  --Clear to Auscultation  Heart-  --Regular rate and rhythm  Abdomen--  --Soft, non-tender, non-distended with minimal pain to palpation over the " left lower abdomen.  Skin-  --Pink and dry  Psych-  --Alert and oriented  --Normal affect    UA-normal      A/P:   1. Pelvic pain  The patient appears well, is alert and pleasant and in no distress.  Her UA is normal.  She describes a vague intermittent abdominal pain.  Certainly cannot exclude ovarian cyst, endometriosis, or gastritis.  Recommended pelvic ultrasound and the patient agreed to follow-up after the ultrasound with her OB/GYN physician in [place].  - Urinalysis  - Culture, Urine  - US Pelvis With Transvaginal Non OB; Future

## 2021-05-31 NOTE — TELEPHONE ENCOUNTER
Patient Returning Call  Reason for call:  Message from clinic  Information relayed to patient: Left message to call back for: pt  Information to relay to patient: Just following up to make sure she got these results. Please relay message.  ----- Message from Shane Zacarias MD sent at 8/18/2019  7:25 PM CDT   Please speak to pt(no VM) and tell her sono of pelvis normal.  May represent endometriosis and I'd like her to visit with her OB in [place], [place].  Patient has additional questions:  No  If YES, what are your questions/concerns:  Patient has received this message.  Okay to leave a detailed message?: No call back needed

## 2021-05-31 NOTE — TELEPHONE ENCOUNTER
Left message to call back for: pt  Information to relay to patient: Just following up to make sure she got these results. Please relay message.  ----- Message from Shane Zacarias MD sent at 8/18/2019  7:25 PM CDT -----  Please speak to pt(no VM) and tell her sono of pelvis normal.  May represent endometriosis and I'd like her to visit with her OB in [place], [place].

## 2021-06-02 ENCOUNTER — RECORDS - HEALTHEAST (OUTPATIENT)
Dept: ADMINISTRATIVE | Facility: CLINIC | Age: 31
End: 2021-06-02

## 2021-06-03 VITALS — HEIGHT: 66 IN | BODY MASS INDEX: 22.34 KG/M2 | WEIGHT: 139 LBS

## 2021-06-04 VITALS
DIASTOLIC BLOOD PRESSURE: 64 MMHG | SYSTOLIC BLOOD PRESSURE: 110 MMHG | HEART RATE: 79 BPM | WEIGHT: 155 LBS | BODY MASS INDEX: 25.4 KG/M2 | OXYGEN SATURATION: 98 %

## 2021-06-04 VITALS
WEIGHT: 145 LBS | HEART RATE: 74 BPM | BODY MASS INDEX: 23.76 KG/M2 | OXYGEN SATURATION: 98 % | DIASTOLIC BLOOD PRESSURE: 60 MMHG | SYSTOLIC BLOOD PRESSURE: 100 MMHG

## 2021-06-04 VITALS
BODY MASS INDEX: 25.02 KG/M2 | OXYGEN SATURATION: 98 % | DIASTOLIC BLOOD PRESSURE: 60 MMHG | SYSTOLIC BLOOD PRESSURE: 120 MMHG | WEIGHT: 152.7 LBS | HEART RATE: 88 BPM

## 2021-06-04 VITALS
HEART RATE: 75 BPM | BODY MASS INDEX: 22.78 KG/M2 | WEIGHT: 139 LBS | OXYGEN SATURATION: 98 % | DIASTOLIC BLOOD PRESSURE: 60 MMHG | SYSTOLIC BLOOD PRESSURE: 108 MMHG

## 2021-06-04 VITALS
SYSTOLIC BLOOD PRESSURE: 118 MMHG | HEART RATE: 71 BPM | WEIGHT: 137.9 LBS | OXYGEN SATURATION: 99 % | DIASTOLIC BLOOD PRESSURE: 64 MMHG | BODY MASS INDEX: 22.6 KG/M2

## 2021-06-05 VITALS
TEMPERATURE: 97.9 F | WEIGHT: 152.38 LBS | BODY MASS INDEX: 23.87 KG/M2 | OXYGEN SATURATION: 97 % | SYSTOLIC BLOOD PRESSURE: 120 MMHG | HEART RATE: 92 BPM | DIASTOLIC BLOOD PRESSURE: 70 MMHG

## 2021-06-05 VITALS
HEART RATE: 84 BPM | BODY MASS INDEX: 27.09 KG/M2 | SYSTOLIC BLOOD PRESSURE: 120 MMHG | WEIGHT: 165.3 LBS | DIASTOLIC BLOOD PRESSURE: 72 MMHG | OXYGEN SATURATION: 95 %

## 2021-06-05 VITALS
HEART RATE: 93 BPM | DIASTOLIC BLOOD PRESSURE: 66 MMHG | SYSTOLIC BLOOD PRESSURE: 120 MMHG | WEIGHT: 175 LBS | OXYGEN SATURATION: 98 % | BODY MASS INDEX: 28.68 KG/M2

## 2021-06-05 VITALS
WEIGHT: 171.2 LBS | BODY MASS INDEX: 28.06 KG/M2 | SYSTOLIC BLOOD PRESSURE: 110 MMHG | DIASTOLIC BLOOD PRESSURE: 70 MMHG | OXYGEN SATURATION: 98 % | HEART RATE: 83 BPM

## 2021-06-05 VITALS
SYSTOLIC BLOOD PRESSURE: 120 MMHG | DIASTOLIC BLOOD PRESSURE: 78 MMHG | BODY MASS INDEX: 26.68 KG/M2 | WEIGHT: 162.8 LBS | TEMPERATURE: 98 F

## 2021-06-05 VITALS
WEIGHT: 160.19 LBS | SYSTOLIC BLOOD PRESSURE: 100 MMHG | BODY MASS INDEX: 26.25 KG/M2 | HEART RATE: 89 BPM | DIASTOLIC BLOOD PRESSURE: 60 MMHG

## 2021-06-05 VITALS
SYSTOLIC BLOOD PRESSURE: 114 MMHG | DIASTOLIC BLOOD PRESSURE: 76 MMHG | WEIGHT: 168.5 LBS | HEART RATE: 81 BPM | BODY MASS INDEX: 27.61 KG/M2 | OXYGEN SATURATION: 96 %

## 2021-06-05 VITALS
SYSTOLIC BLOOD PRESSURE: 114 MMHG | OXYGEN SATURATION: 98 % | DIASTOLIC BLOOD PRESSURE: 72 MMHG | WEIGHT: 161.6 LBS | HEART RATE: 90 BPM | BODY MASS INDEX: 26.48 KG/M2

## 2021-06-05 VITALS — BODY MASS INDEX: 27.47 KG/M2 | HEIGHT: 67 IN | WEIGHT: 175 LBS

## 2021-06-05 VITALS — WEIGHT: 175 LBS | BODY MASS INDEX: 27.47 KG/M2 | HEIGHT: 67 IN

## 2021-06-05 VITALS
WEIGHT: 147 LBS | SYSTOLIC BLOOD PRESSURE: 120 MMHG | HEIGHT: 67 IN | BODY MASS INDEX: 23.07 KG/M2 | HEART RATE: 70 BPM | DIASTOLIC BLOOD PRESSURE: 80 MMHG | OXYGEN SATURATION: 96 %

## 2021-06-05 VITALS
DIASTOLIC BLOOD PRESSURE: 70 MMHG | SYSTOLIC BLOOD PRESSURE: 120 MMHG | WEIGHT: 170.5 LBS | BODY MASS INDEX: 27.94 KG/M2 | OXYGEN SATURATION: 95 % | HEART RATE: 73 BPM

## 2021-06-05 VITALS
OXYGEN SATURATION: 98 % | WEIGHT: 173.2 LBS | BODY MASS INDEX: 28.38 KG/M2 | SYSTOLIC BLOOD PRESSURE: 130 MMHG | DIASTOLIC BLOOD PRESSURE: 70 MMHG | HEART RATE: 87 BPM

## 2021-06-09 NOTE — TELEPHONE ENCOUNTER
Reason contacted:  Appointment   Information relayed:  Helped arrange an appointment on 8/6/2020 with Dr. Lance. Patient reports she is 7 weeks gestation this week.   Additional questions:  No  Further follow-up needed:  No  Okay to leave a detailed message:  No

## 2021-06-09 NOTE — TELEPHONE ENCOUNTER
New Appointment Needed  What is the reason for the visit:    First OB Appt Request  Have you had a pregnancy confirmation appointment at a [facility] primary care clinic?:  No. patient had a pregnancy confirmation on 7/7/2020 at [facility]  When was your positive home pregnancy test?:   date: pregnancy confirmation was 7/7, last menstrual cycle was on 5/25/2020    Provider Preference: Dr Lance  How soon do you need to be seen?: at 10-12 weeks pregnant, due date if 3/1/2021, 2:30pm or later  Waitlist offered?: No  Okay to leave a detailed message:  Yes

## 2021-06-10 NOTE — TELEPHONE ENCOUNTER
RN Triage:    20 weeks pregnant.  Began spotting last evening necessitating a tampon (did not have pads).  Changed tampon twice.  Spotting continues today, but has slowed and is only on toilet paper now.  Also has intermittent right lower abdominal pain.  Pain is at 2/10 on pain scale when present.  Precautions discussed.  Triage nurse phoned covering physician's office.  Dr. Lance's covering physician, Dr. Paris, will call Iwona back in a few minutes.    Betsy Brewer RN  [facility] Nurse Advisor          Reason for Disposition    Intermittent lower abdominal pain (e.g., cramping) lasting > 24 hours    Additional Information    Negative: Shock suspected (e.g., cold/pale/clammy skin, too weak to stand, low BP, rapid pulse)    Negative: Difficult to awaken or acting confused (e.g., disoriented, slurred speech)    Negative: Passed out (i.e., fainted, collapsed and was not responding)    Negative: Sounds like a life-threatening emergency to the triager    Negative: Vaginal bleeding and pregnant > 20 weeks    Negative: Not pregnant or pregnancy status unknown    Negative: SEVERE abdominal pain (e.g., excruciating)    Negative: SEVERE vaginal bleeding (i.e., soaking 2 pads / hour, large blood clots) and present for 2 or more hours    Negative: SEVERE dizziness (e.g., unable to stand, requires support to walk, feels like passing out)    Negative: MODERATE vaginal bleeding (i.e., soaking 1 pad) and present > 6 hours    Negative: MODERATE vaginal bleeding (i.e., soaking 1 pad / hour, clots) and pregnant > 12 weeks    Negative: Passed tissue (e.g., gray-white)    Negative: Shoulder pain    Negative: Constant abdominal pain lasting > 1 hour    Negative: Fever > 100.4 F (38.0 C)    Negative: Pale skin (pallor) of new onset or worsening    Negative: Patient sounds very sick or weak to the triager    Negative: MODERATE vaginal bleeding (i.e., soaking 1 pad / hour; clots)    Protocols used: PREGNANCY - VAGINAL  BLEEDING LESS THAN 20 WEEKS EGA-A-OH

## 2021-06-10 NOTE — TELEPHONE ENCOUNTER
Question following Office Visit  When did you see your provider: 8/7/2020  What is your question: States she was to have a Ultra Sound done and there is no orders for this in her chart.  Please advise patient.  Okay to leave a detailed message: Yes

## 2021-06-10 NOTE — PROGRESS NOTES
SUBJECTIVE:  Iwona Hill is a 30 y.o. female.  She spotting last night around dinner time. She works for [employer], she is a water tech. [place] was about one week ago. Spotting was heavier through the evening and then did use two tampons. She does now only have a little with wiping. She did have some spotting as well about 4 weeks ago. She did have some right abdominal pain starting yesterday am and that does go down into the bottom of her stomach as well. She has had no urinary symptoms.     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 Last time she did canoeing was 20  Chief Complaint   Patient presents with     Routine Prenatal Visit     First pregnancy, 12 weeks. Patient began to have vaginal spotting yesterday with some intermittent right lower abdominal pain. Patient states the spotting has became more lighter since this afternoon, still having some of abdomen pain though. There is no pain with urination and no vaginal discharge.      Exam today shows a normal-appearing cervix with some minimal ectropion but no friability, bimanual exam not done today    OBJECTIVE:   /64 (Patient Site: Right Arm, Patient Position: Sitting, Cuff Size: Adult Regular)   Pulse 71   Wt 137 lb 14.4 oz (62.6 kg)   LMP 2020 (Exact Date)   SpO2 99%   Breastfeeding No   BMI 22.60 kg/m     Please see prenatal data for fundal height and fetal HR.     ASSESSMENT/PLAN:   Iwona Hill is a 30 y.o. female  who presents for first semester bleeding.  -She did have bleeding 1 month ago after some heavy exertion as well as again yesterday and today.  We discussed the possibilities including cervical bleeding, subchorionic hemorrhage or threatened miscarriage.  Her exam thankfully today is normal but as her cervix is unremarkable appearing I am referring her for an ultrasound which should be done today  to look for signs of subchorionic hemorrhage or other abnormalities.

## 2021-06-10 NOTE — PROGRESS NOTES
PRENATAL VISIT   FIRST OBSTETRICAL EXAM - OB    Assessment / Impression     30-year-old G1, P0 with an LMP of 5/25/20 and EDC is 3/1/2021.  EDC is confirmed using ultrasound.  She is received first trimester education and is taking a prenatal vitamin.  Normal first prenatal visit at Unknown  Discussed orientation, general information, lifestyle, nutrition, exercise,warning signs, resources, lab testing, risk screening and discussed cystic fibrosis screening with patient.  Questions answered.    Plan:     First OB labs and progenitor drawn today.  Encouraged adding DHA and vitamin D thousand units to her prenatal vitamin.  We will see her again at 16 weeks at which time we will do AFP screening.  For genetic testing results, they would like to receive the progenitor results and have the gender information released only to the patient's mother.   Initial labs drawn.  Prenatal vitamins.  Problem list reviewed and updated.  Genetic screening test options discussed:  Patient elects Cell free DNA test  Role of ultrasound in pregnancy discussed; fetal survey: requested.  Follow up: No follow-ups on file.      Subjective:    Iwona Hill is a 30 y.o.  here today for her First Obstetrical Exam.   OB History   No obstetric history on file.       Expected Date of Delivery: Not found.    No past medical history on file.  Past Surgical History:   Procedure Laterality Date     OR REMOVAL OF TONSILS,<11 Y/O      Description: Tonsillectomy;  Proc Date: 10/01/2004;     Social History     Tobacco Use     Smoking status: Never Smoker     Smokeless tobacco: Never Used   Substance Use Topics     Alcohol use: Not on file     Drug use: Not on file     Current Outpatient Medications   Medication Sig Dispense Refill     prenatal no115-iron-folic acid 29 mg iron- 1 mg Chew Chew daily.       No current facility-administered medications for this visit.      No Known Allergies          High Risk Behavior: None    Review of  Systems  General:  Denies problem  Eyes: Denies problem  Ears/Nose/Throat: Denies problem  Cardiovascular: Denies problem  Respiratory:  Denies problem  Gastrointestinal:  Denies problem, Genitourinary: Denies problem  Musculoskeletal:  Denies problem  Skin: Denies problem  Neurologic: Denies problem  Psychiatric: Denies problem  Endocrine: Denies problem  Heme/Lymphatic: Denies problem   Allergic/Immunologic: Denies problem       Objective:   Objective    Vitals:    08/06/20 1515   BP: 108/60   Pulse: 75   SpO2: 98%   Weight: 139 lb (63 kg)     Physical Exam:  General Appearance: Alert, cooperative, no distress, appears stated age  Head: Normocephalic, without obvious abnormality, atraumatic  Eyes: PERRL, conjunctiva/corneas clear, EOM's intact  Ears: Normal TM's and external ear canals, both ears  Nose: Nares normal, septum midline,mucosa normal, no drainage  Throat: Lips, mucosa, and tongue normal; teeth and gums normal  Neck: Supple, symmetrical, trachea midline, no adenopathy;  thyroid: not enlarged, symmetric, no tenderness/mass/nodules; no carotid bruit or JVD  Back: Symmetric, no curvature, ROM normal, no CVA tenderness  Lungs: Clear to auscultation bilaterally, respirations unlabored  Breasts: No breast masses, tenderness, asymmetry, or nipple discharge.  Heart: Regular rate and rhythm, S1 and S2 normal, no murmur, rub, or gallop, Abdomen: Soft, non-tender, bowel sounds active all four quadrants,  no masses, no organomegaly  Pelvic:Normally developed genitalia with no external lesions or eruptions. Vagina and cervix show no lesions, inflammation, discharge or tenderness. No cystocele, No rectocele. Uterus 12 weeks.  No adnexal mass or tenderness.    Extremities: Extremities normal, atraumatic, no cyanosis or edema  Skin: Skin color, texture, turgor normal, no rashes or lesions  Lymph nodes: Cervical, supraclavicular, and axillary nodes normal  Neurologic: Normal     Lab:   Results for orders placed or  performed in visit on 08/15/19   Culture, Urine    Specimen: Urine   Result Value Ref Range    Culture No Growth    Urinalysis   Result Value Ref Range    Color, UA Yellow Colorless, Yellow, Straw, Light Yellow    Clarity, UA Clear Clear    Glucose, UA Negative Negative    Bilirubin, UA Negative Negative    Ketones, UA Negative Negative    Specific Gravity, UA >=1.030 1.005 - 1.030    Blood, UA Moderate (!) Negative    pH, UA 5.5 5.0 - 8.0    Protein, UA Negative Negative mg/dL    Urobilinogen, UA 0.2 E.U./dL 0.2 E.U./dL, 1.0 E.U./dL    Nitrite, UA Negative Negative    Leukocytes, UA Negative Negative    Bacteria, UA Few (!) None Seen hpf    RBC, UA 3-5 (!) None Seen, 0-2 hpf    WBC, UA 0-5 None Seen, 0-5 hpf    Squam Epithel, UA 10-25 (!) None Seen, 0-5 lpf

## 2021-06-10 NOTE — TELEPHONE ENCOUNTER
I reached out to pt today and we spoke. I informed pt that we will need to schedule a lab appt for a urine sample and pt agreed to coming in. Pt scheduled for a lab appt today. Pt also needs to sign the progenity form and Penny (lab) knows. Form in lab with Penny as well.    Pt is requesting to not wanting to know the sex of the baby but her mother wants to know. Pt is requesting that progenity results be mailed to mom's address:   [facility] [address], [place] [address]. Add on the outside of the envelope: Progenity results! Per pt.

## 2021-06-11 NOTE — TELEPHONE ENCOUNTER
Left message to call back for: letter request  Information to relay to patient:  Notified via VM letter is ready for pick-up at the .

## 2021-06-11 NOTE — TELEPHONE ENCOUNTER
Who is requesting the letter?  Patient  Why is the letter needed? Patient stated she will need a letter generated stating she can only lift 10-15 pounds.  How would you like this letter returned? Patient stated she will pick the letter up. Patient's last Prenatal visit with Dr. Lance was on 08/06/2020.  Okay to leave a detailed message? Yes

## 2021-06-11 NOTE — PROGRESS NOTES
30-year-old G1, P0 at 16 weeks and 3 days gestation.  She is had some vaginal bleeding the pregnancy and she is placed on weight restriction for work duties.  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  She felt some fluttering last night.  We discussed that if she has DHA and vitamin D in her prenatal vitamin that she does not need to take any additional.  We discussed that it is okay for her to go on her hunting trips.  We discussed taking, precautions for safety.  She is already scheduled her fetal survey.  We discussed that her [place] screen was negative.  They do not wish to know the gender of the baby.  Follow-up in 1 month.

## 2021-06-12 NOTE — PROGRESS NOTES
31 yo  female at 21 weeks gestation with uncomplicated first pregnancy.  Reviewed US findings of low lying placenta. Plan to recheck in 8 weeks. No recent bleeding or pain.  She is still limiting lifting at work which I continue to endorse.  F/u 1 month

## 2021-06-13 NOTE — PROGRESS NOTES
"Patient is a 30 y.o.  at 27w3d here for routine OB visit. Patient's OB course has thus far been complicated by nothing.     Subjective:   Specific concerns:   Patient began experiencing increased urinary frequency last night.  By this morning it began hurting.  It \"kept her up all night\".  She endorses frequency, urgency, odor to urine, and right lower back pain x24 hours.  She denies fevers.  She does have some right abdominal pain.  She has an ultrasound scheduled for tomorrow and follows up with her regular PCP in 1 week.    Morning sickness: no   Fatigue: no  Heartburn: yes  Cramping/contractions: no  Vaginal bleeding: no   Vaginal discharge: no  Leaking of fluid: no   Urinary symptoms: yes  GI symptoms: no  Fetal movement: yes  Taking prenatal vitamins: yes  Headaches: no  Visual changes: no  RUQ pain: no  Edema: yes    Objective:  /78   Temp 98  F (36.7  C)   Wt 162 lb 12.8 oz (73.8 kg)   LMP 2020 (Exact Date)   BMI 26.68 kg/m    FHTs: present, within the normal range  Edema: trace  Cervical exam: n/a  CVA tenderness absent    Assessment/Plan: 30 y.o.  at 27w3d here evaluation of suspected UTI complicating pregnancy.  Patient's urine analysis is positive for a UTI, and we chose to treat with 5 days of Keflex.  Patient is under strict return precautions.     Return to clinic in 1 week(s).                  "

## 2021-06-13 NOTE — PROGRESS NOTES
Healthy 30-year-old G1, P0 at 24 weeks and 3 days gestation.  Pregnancy has been complicated by a low-lying placenta.  An order was placed to recheck that in 4 weeks.  She is not had any further cramping or bleeding.  We do complete [facility] paperwork to have her remain doing light duty work for the remainder of the pregnancy.  She cannot lift greater than 15 to 20 pounds due to her low-lying placenta.  We discussed signs and symptoms of  labor.  She will return in 1 month and at that time we will do 1 hour glucose and hemoglobin testing.  She will continue on her prenatal vitamins.

## 2021-06-13 NOTE — PATIENT INSTRUCTIONS - HE
"CALL CLINIC RIGHT AWAY IF YOU HAVE:     Vaginal bleeding    Severe abdominal pain    Severe vomiting or diarrhea lasting more than 12 hours    Temperature of 100.4F or higher    Severe headache    Blood in your urine or burning, painful urination    Vaginal discharge with a bad odor, irritation or itching   LABOR   labor is labor before the 37th week of pregnancy. Babies born before the 37 weeks may have to stay in the hospital for extra days or weeks. The earlier your baby is born, the more problems he/she may have. Although premature labor can happen to any women, some things increase the chances. Just because you have a CHANCE for  labor does not mean you will have a premature baby. If you know what to look for, you will be able to tell if premature labor is happening to your and you can get earlier help. If  labor is recognized in time, it may be stopped. WATCH for the following signs:  SIGNS OF  LABOR  1. Menstrual-like cramps (constant or come and go, above the pubis bone)  2. Low, dull backache (constant or comes and goes)  3. Pressure (feels like the baby is pushing down; feels heavy)  4. Abdominal cramping (with or without diarrhea)  5. Increase or change in vaginal discharge (mucous-like, watery, light blood discharge)  6. Uterine contractions that are ten minutes apart or closer (5 OR MORE CONTRACTIONS IN ONE HOUR). These contractions may be painless. Contractions may feel like the baby is \"balling-up: inside you.   REMEMBER:  labor is generally not painful.  HOW TO FEEL FOR CONTRACTIONS  1. When you are lying down, place your fingers on top of your uterus (womb).  2. A yasir uterus gets hard (tight) and then soft (relaxed).  3. If your uterus is getting hard, and then soft, write down the time the contraction starts, how long it lasts, and what time the next contraction begins.  WHEN TO FEEL FOR CONTRACTIONS  Feel you uterus for contractions a least twice a " day for half an hour. Try to feel for contractions at the same time every day.  WHAT TO DO IF YOU HAVE CONTRACTIONS OR OTHER SIGNS OR  LABOR  1. Lie down; turn on your side for one hour. While resting, try to drink two or three glasses of water or juice. Many times, the symptoms or  labor will go away.  2. If the signs do not go away IN ONE HOUR or you are having 5 OR MORE contractions in one hour:   Direct number for [facility] is: [phone]  Direct number for [facility] is: [phone]

## 2021-06-13 NOTE — TELEPHONE ENCOUNTER
Dr. Freitas I was able to schedule an appointment with you today as you had a cancellation. Should this be scheduled as an OB visit or ER follow-up?

## 2021-06-13 NOTE — TELEPHONE ENCOUNTER
I called pt and we spoke. I have r/s pt's appt on 12/31 to see Dr. Freitas, same day at 3:30 PM.     Paperwork: pt is needing proof of pregnancy so that HR can approve her for FMLA and work restrictions per pt.

## 2021-06-13 NOTE — TELEPHONE ENCOUNTER
On call provider called for right flank pain. Patient is a  at 27w3d and was seen earlier today for urinary symptoms and prescribed keflex for UTI. Since that time she developed right flank pain which has become more severe. She currently rates the pain 8/10. Due to concern for possible acute ascending infection, recommend patient be seen in ED.     Katherine Rogers DO

## 2021-06-13 NOTE — PROGRESS NOTES
Assessment/Plan:    1. Acute cystitis in pregnancy, antepartum  G1 at 27+4 weeks today. Pregnancy has been overall uncomplicated except: low lying placenta on fetal survey (has f/u US today) and cystitis in pregnancy for which she went to ER yesterday. Pt reports feeling much better today - no nausea or pain, improving urinary symptoms, adequate PO intake. Discussed continuing antibiotic course, will mychart message when culture result returns, tylenol as needed for pain (narcotic only for severe pain), zofran as needed for nausea. Plan f/u next week as scheduled.      Follow up: 1 week (already scheduled on 12/10 with PCP)    Sandee Freitas MD  [facility]    Subjective:    Patient ID: Iwona Hill is a 30 y.o. female is here today for ER f/u    ER f/u, cystitis  -pt was seen by Dr Jett yesterday for NINI visit - mentioned urinary issues - UA/UC done and pt started on keflex  -was having more discomfort overnight so went to ER ([facility] in [place], see Care Everywhere)  -ER note reviewed: mainly presented for RLQ, also had nausea/vomiting - nml vitals, no contractions but some uterine irritability, normal FHR - given zofran, IVF, IV fentanyl and 1 dose IV ceftriaxone - diagnosed with cystitis with hematuria  -has US today to recheck prior low lying placenta  -urine culture still in process  -feeling much better today - pain and nausea much better today  -not as much urinary pain today  -normal intake  -hasn't had to use narcotic pain meds   -hasn't needed nausea medication      Patient Active Problem List   Diagnosis     Low-lying placenta     Pregnancy     Past Surgical History:   Procedure Laterality Date     WI REMOVAL OF TONSILS,<13 Y/O      Description: Tonsillectomy;  Proc Date: 10/01/2004;     Current Outpatient Medications on File Prior to Visit   Medication Sig Dispense Refill     cephalexin (KEFLEX) 500 MG capsule Take 1 capsule (500 mg total) by mouth 2 (two) times  a day for 5 days. 10 capsule 0     clindamycin (CLINDAGEL) 1 % gel APPLY AND GENTLY MASSAGE INTO AFFECTED AREA(S) TWICE DAILY. 60 g 0     prenatal no115-iron-folic acid 29 mg iron- 1 mg Chew Chew daily.       No current facility-administered medications on file prior to visit.      No Known Allergies  Social History     Socioeconomic History     Marital status: Single     Spouse name: Not on file     Number of children: Not on file     Years of education: Not on file     Highest education level: Not on file   Occupational History     Not on file   Social Needs     Financial resource strain: Not on file     Food insecurity     Worry: Not on file     Inability: Not on file     Transportation needs     Medical: Not on file     Non-medical: Not on file   Tobacco Use     Smoking status: Never Smoker     Smokeless tobacco: Never Used   Substance and Sexual Activity     Alcohol use: Not on file     Drug use: Not on file     Sexual activity: Not on file   Lifestyle     Physical activity     Days per week: Not on file     Minutes per session: Not on file     Stress: Not on file   Relationships     Social connections     Talks on phone: Not on file     Gets together: Not on file     Attends Confucianism service: Not on file     Active member of club or organization: Not on file     Attends meetings of clubs or organizations: Not on file     Relationship status: Not on file     Intimate partner violence     Fear of current or ex partner: Not on file     Emotionally abused: Not on file     Physically abused: Not on file     Forced sexual activity: Not on file   Other Topics Concern     Not on file   Social History Narrative     Not on file     Family History   Problem Relation Age of Onset     Hypertension Mother      Parkinsonism Mother      Breast cancer Maternal Grandmother      Stroke Maternal Grandfather      Alzheimer's disease Maternal Grandfather      Heart disease Paternal Grandmother      Diabetes type II Paternal  Grandmother      Stroke Paternal Uncle      Diabetes type II Paternal Uncle      CABG Paternal Uncle      Psoriasis Father      Review of systems is as stated in HPI, and the remainder of system review is otherwise negative.    Objective:      /60   Pulse 89   Wt 160 lb 3 oz (72.7 kg)   LMP 05/25/2020 (Exact Date)   BMI 26.25 kg/m      General appearance: awake, NAD  HEENT: atraumatic, normocephalic, no scleral icterus or injection  Lungs: breathing comfortably on room air  Extremities: moving all extremities  Neuro: alert, oriented x3, CNs grossly intact, no focal deficits appreciated  Psych: normal mood/affect/behavior, answering questions appropriately, linear thought process

## 2021-06-13 NOTE — TELEPHONE ENCOUNTER
"Please call patient at .    Patient is 27 weeks gestation.   Stating she has had pain on right side \"near hip, in the front\" all night that has remained \"the same.\" Reporting urinary frequency, with smaller amounts. Nausea. Afebrile. Patient has taken Tylenol at 630 a.m. rating pain level \"6.\" Fetal movement is positive. Denies vaginal discharge.     Shonda Valenzuela RN  [facility]      COVID 19 Nurse Triage Plan/Patient Instructions    Please be aware that novel coronavirus (COVID-19) may be circulating in the community. If you develop symptoms such as fever, cough, or SOB or if you have concerns about the presence of another infection including coronavirus (COVID-19), please contact your health care provider or visit [email or web address].     Disposition/Instructions    Additional COVID19 information to add for patients.   How can I protect others?  If you have symptoms (fever, cough, body aches or trouble breathing): Stay home and away from others (self-isolate) until:    At least 10 days have passed since your symptoms started, And     You ve had no fever--and no medicine that reduces fever--for 1 full day (24 hours), And      Your other symptoms have resolved (gotten better).     If you don t have symptoms, but a test showed that you have COVID-19 (you tested positive):    Stay home and away from others (self-isolate). Follow the tips under \"How do I self-isolate?\" below for 10 days (20 days if you have a weak immune system).    You don't need to be retested for COVID-19 before going back to school or work. As long as you're fever-free and feeling better, you can go back to school, work and other activities after waiting the 10 or 20 days.     How do I self-isolate?    Stay in your own room, even for meals. Use your own bathroom if you can.     Stay away from others in your home. No hugging, kissing or shaking hands. No visitors.    Don t go to work, school or anywhere else.     Clean "  high touch  surfaces often (doorknobs, counters, handles, etc.). Use a household cleaning spray or wipes. You ll find a full list on the EPA website:  www.epa.gov/pesticide-registration/list-n-disinfectants-use-against-sars-cov-2.    Cover your mouth and nose with a mask, tissue or washcloth to avoid spreading germs.    Wash your hands and face often. Use soap and water.    Caregivers in these groups are at risk for severe illness due to COVID-19:  o People 65 years and older  o People who live in a nursing home or long-term care facility  o People with chronic disease (lung, heart, cancer, diabetes, kidney, liver, immunologic)  o People who have a weakened immune system, including those who:  - Are in cancer treatment  - Take medicine that weakens the immune system, such as corticosteroids  - Had a bone marrow or organ transplant  - Have an immune deficiency  - Have poorly controlled HIV or AIDS  - Are obese (body mass index of 40 or higher)  - Smoke regularly    Caregivers should wear gloves while washing dishes, handling laundry and cleaning bedrooms and bathrooms.    Use caution when washing and drying laundry: Don t shake dirty laundry, and use the warmest water setting that you can.    For more tips, go to www.cdc.gov/coronavirus/2019-ncov/downloads/10Things.pdf.    How can I take care of myself?  1. Get lots of rest. Drink extra fluids (unless a doctor has told you not to).     2. Take Tylenol (acetaminophen) for fever or pain. If you have liver or kidney problems, ask your family doctor if it s okay to take Tylenol.     Adults can take either:     650 mg (two 325 mg pills) every 4 to 6 hours, or     1,000 mg (two 500 mg pills) every 8 hours as needed.     Note: Don t take more than 3,000 mg in one day.   Acetaminophen is found in many medicines (both prescribed and over-the-counter medicines). Read all labels to be sure you don t take too much.     For children, check the Tylenol bottle for the right dose.  The dose is based on the child s age or weight.    3. If you have other health problems (like cancer, heart failure, an organ transplant or severe kidney disease): Call your specialty clinic if you don t feel better in the next 2 days.    4. Know when to call 911: Emergency warning signs include:    Trouble breathing or shortness of breath    Pain or pressure in the chest that doesn t go away    Feeling confused like you haven t felt before, or not being able to wake up    Bluish-colored lips or face    What are the symptoms of COVID-19?     The most common symptoms are cough, fever and trouble breathing.     Less common symptoms include body aches, chills, diarrhea (loose, watery poops), fatigue (feeling very tired), headache, runny nose, sore throat and loss of smell.    COVID-19 can cause severe coughing (bronchitis) and lung infection (pneumonia).    How does it spread?     The virus may spread when a person coughs or sneezes into the air. The virus can travel about 6 feet this way, and it can live on surfaces.      Common  (household disinfectants) will kill the virus.    Who is at risk?  Anyone can catch COVID-19 if they re around someone who has the virus.    How can others protect themselves?     Stay away from people who have COVID-19 (or symptoms of COVID-19).    Wash hands often with soap and water. Or, use hand  with at least 60% alcohol.    Avoid touching the eyes, nose or mouth.     Wear a face mask when you go out in public, when sick or when caring for a sick person.    Where can I get more information?    [facility]: About COVID-19: [email or web address]    CDC: What to Do If You re Sick: www.cdc.gov/coronavirus/2019-ncov/about/steps-when-sick.html    CDC: Ending Home Isolation: www.cdc.gov/coronavirus/2019-ncov/hcp/disposition-in-home-patients.html     CDC: Caring for Someone: www.cdc.gov/coronavirus/2019-ncov/if-you-are-sick/care-for-someone.html     [facility]: Interim  Guidance for Hospital Discharge to Home: www.[facility]./diseases/coronavirus/hcp/hospdischarge.pdf    [facility] clinical trials (COVID-19 research studies): clinicalaffairs.[facility]-clinical-trials     Below are the COVID-19 hotlines at the [place] [facility] ([facility]). Interpreters are available.   o For health questions: Call 489-332-4577 or 1-403.687.6880 (7 a.m. to 7 p.m.)  o For questions about schools and childcare: Call 492-888-0218 or 1-502.625.4155 (7 a.m. to 7 p.m.)              Thank you for taking steps to prevent the spread of this virus.  o Limit your contact with others.  o Wear a simple mask to cover your cough.  o Wash your hands well and often.    Resources     [facility]: About COVID-19: [email or web address]    CDC: What to Do If You're Sick: www.cdc.gov/coronavirus/2019-ncov/about/steps-when-sick.html    CDC: Ending Home Isolation: www.cdc.gov/coronavirus/2019-ncov/hcp/disposition-in-home-patients.html     CDC: Caring for Someone: www.cdc.gov/coronavirus/2019-ncov/if-you-are-sick/care-for-someone.html     [facility]: Interim Guidance for Hospital Discharge to Home: www.[facility]./diseases/coronavirus/hcp/hospdischarge.pdf    [facility] clinical trials (COVID-19 research studies): clinicalaffairs.[facility]/n-clinical-trials     Below are the COVID-19 hotlines at the [place] [facility] ([facility]). Interpreters are available.   o For health questions: Call 319-517-0735 or 1-124.265.4121 (7 a.m. to 7 p.m.)  o For questions about schools and childcare: Call 482-177-2418 or 1-779.620.9239 (7 a.m. to 7 p.m.)     Reason for Disposition    MODERATE-SEVERE abdominal pain (e.g., interferes with normal activities, awakens from sleep)    Additional Information    Negative: Passed out (i.e., fainted, collapsed and was not responding)    Negative: Shock suspected (e.g., cold/pale/clammy skin, too weak to stand, low BP, rapid pulse)     Negative: Difficult to awaken or acting confused (e.g., disoriented, slurred speech)    Negative: SEVERE abdominal pain (e.g., excruciating), constant, and present > 1 hour    Negative: SEVERE bleeding (e.g., continuous red blood from vagina, or large blood clots)    Negative: Sounds like a life-threatening emergency to the triager    Negative: Having contractions or other symptoms of labor (such as vaginal pressure) and > 36 weeks pregnant (i.e., term pregnancy)    Negative: Having contractions or other symptoms of labor (such as vaginal pressure) and < 37 weeks pregnant (i.e., )    Negative: Abdominal pain and pregnant < 20 weeks    Negative: Vomiting red blood or black (coffee ground) material    Protocols used: PREGNANCY - ABDOMINAL PAIN GREATER THAN 20 WEEKS EGA-A-OH

## 2021-06-13 NOTE — TELEPHONE ENCOUNTER
"Patient says she was seen in the clinic for  abdominal and UTI and she has started the antibiotic.  She is currently at 27 weeks; JESSICA March 1st; OB: Dr. Lance; plans to deliver at [facility].  Patient reports her abdominal pain seems worse and constant since 4:30pm. Pain is same area lower right and right back area near her spine.  Patient reports pain became 8/10 worsening at 8:30pm.  Patient reports no fever.   FNA paged Dr. Rogers at 10:54pm and recommendation is for patient to be seen in the ED.  Patient was informed and agrees.      Reason for Disposition    MODERATE-SEVERE abdominal pain (e.g., interferes with normal activities, awakens from sleep)    Additional Information    Negative: Passed out (i.e., lost consciousness, collapsed and was not responding)    Negative: Shock suspected (e.g., cold/pale/clammy skin, too weak to stand, low BP, rapid pulse)    Negative: Difficult to awaken or acting confused (e.g., disoriented, slurred speech)    Negative: [1] SEVERE abdominal pain (e.g., excruciating) AND [2] constant AND [3] present > 1 hour    Negative: SEVERE vaginal bleeding (e.g., continuous red blood from vagina, or large blood clots)    Negative: Sounds like a life-threatening emergency to the triager    Negative: [1] Vomiting AND [2] contains red blood or black (\"coffee ground\") material  (Exception: few red streaks in vomit that only happened once)    Protocols used: PREGNANCY - ABDOMINAL PAIN GREATER THAN 20 WEEKS EGA-A-AH      "

## 2021-06-13 NOTE — TELEPHONE ENCOUNTER
Are either of you able to see this patient today?     No appointments with Dr. John, Dr. Amor, Dr. Song, or Dr. Paris available today.

## 2021-06-13 NOTE — PROGRESS NOTES
29 yo  at 28w3d gestation with A pos blood type.  Pregnancy complicated by low lying placenta, now resolved.  Doing well without concerns.  Continues to work from home and will for the remainder of the pregnancy.  Feeling good fetal movement.  No further cramping- resolved after treatment for UTI.  1 GTT normal today.  Follow up in 2 weeks.

## 2021-06-14 NOTE — PROGRESS NOTES
ASSESSMENT/PLAN  1. Oral contraceptive use  Stable with current medication and no plans for pregnancy.  Continue and refill medications today.  Pap up-to-date from 2016, repeat 2019 by current guidelines.  - norgestimate-ethinyl estradiol (TRI-ESTARYLLA) 0.18/0.215/0.25 mg-35 mcg (28) Tab tablet; TAKE 1 TABLET BY MOUTH DAILY.  Dispense: 3 Package; Refill: 3    2. Other acne  Stable with topical clindamycin.  Okay refill.  - clindamycin (CLINDAGEL) 1 % gel; APPLY AND GENTLY MASSAGE INTO AFFECTED AREA(S) TWICE DAILY.  Dispense: 60 g; Refill: 0    3. Screening cholesterol level  Family history of coronary artery disease noted.  Patient without other significant risk factors but since we are drawing blood today agrees to doing screening level.    4. Screen for STD (sexually transmitted disease)  Patient with relatively new partner.  Annual screening for gonorrhea chlamydia reviewed and patient opts to do other STD screening as well.  Safe sex practices reviewed including the importance of use of condoms.  - Chlamydia trachomatis & Neisseria gonorrhoeae, Amplified Detection  - Syphilis Screen, Cascade  - HIV Antigen/Antibody Screening Cascade  - Hepatitis B Surface Antigen (HBsAG)    Annual flu shot to be administered today    Pt states an understanding and agrees to the above plan.  Follow-up 1 year or sooner if needed.            SUBJECTIVE:   Chief Complaint   Patient presents with     Medication Management     Iwona Perez 27 y.o. female    Current Outpatient Prescriptions   Medication Sig Dispense Refill     clindamycin (CLINDAGEL) 1 % gel APPLY AND GENTLY MASSAGE INTO AFFECTED AREA(S) TWICE DAILY. 60 g 0     melatonin 1 mg Tab tablet Take 3 mg by mouth bedtime as needed.       norgestimate-ethinyl estradiol (TRI-ESTARYLLA) 0.18/0.215/0.25 mg-35 mcg (28) Tab tablet TAKE 1 TABLET BY MOUTH DAILY. 3 Package 3     No current facility-administered medications for this visit.      Allergies: Review of patient's  allergies indicates no known allergies.   Patient's last menstrual period was 11/08/2017 (exact date).    HPI:   Iwona is a 27-year-old female in today for routine medication check.  Patient has acne and controls this with over-the-counter Clinique facial wash twice daily and topical clindamycin gel.  Outbreaks are limited to T-zone with little chest or back issues.  Patient would like to continue with current management and requests refill of her clindamycin.  Iwona is sexually active and is on birth control pill.  She reports cycles are regular with no spotting in between.  She is using condoms with current partner.  She is been with a new partner over the last 3 months after getting out of a long-term relationship.  She has no concerns regarding discharge, lesions, or irritation in the vaginal area.  She has done routine gonorrhea and chlamydia screening annually but no other STD testing.  She is open to doing that today.  Last screening done 10/16 and normal including normal Pap smear.  Patient requests a flu shot.  ROS: negative except as per HPI    OBJECTIVE:   The patient appears well, alert, oriented x 3, in no distress.  PHQ2 =0  /80 (Patient Site: Left Arm, Patient Position: Sitting, Cuff Size: Adult Regular)  Pulse 60  Temp 98.3  F (36.8  C) (Oral)   Resp 14  Wt 138 lb 3.2 oz (62.7 kg)  LMP 11/08/2017 (Exact Date)  BMI 21.97 kg/m2    25 minutes spent face-to-face with patient, greater than 50% of this in counseling regarding the above, making plans for follow-up care, and medication management.  Family history reviewed and updated.  Healthcare maintenance reviewed including immunizations and Pap smear.  Medication list reconciled.

## 2021-06-14 NOTE — PROGRESS NOTES
Healthy 30-year-old G1, P0 at 33 weeks and 3 days gestation and a positive blood type.  Pregnancy has been uncomplicated.  She did have a ultrasound of her left breast due to some bloody discharge.  I am awaiting the dictation.  I suspect that this is more so related to initiation of lactation.  She is received her tetanus and her influenza.  Her  Richard is with her today.  She is preregister.  We discussed the impact of Covid on maternity care currently.  Discussed when to be seen for contractions and signs of  labor.  Plan to follow-up in 2 weeks.

## 2021-06-14 NOTE — PROGRESS NOTES
Healthy 30-year-old G5 1P0 at 35 weeks and 3 days gestation with an uncomplicated pregnancy.  She had a low-lying placenta which resolved.  She has received influenza and Tdap.  She is a positive blood type.  We discussed her birth plans today with her  Richard present.  She prefers natural childbirth but will consider an epidural if needed with planned delivery at . Birth plan can be referenced in patient messages.  Weekly visits with GBS next visit.

## 2021-06-14 NOTE — PROGRESS NOTES
30-year-old  female at 32 weeks and 1 day gestation with an EDC of 3/1/2021 and a positive blood type.  Pregnancy has been uncomplicated.  She is feeling good fetal movement and overall feeling well.  She is having some mild reflux and is using Tums as needed.  She has received her influenza vaccination.  She is taking additional iron based on her mildly low hemoglobin of 11.5.  She passed her 1 hour glucose testing.  She does not have any cramping or contractions.  She has noticed some bloody discharge from her left breast and is leaking milk at times.  It seems like when she leaks the milk is when she notices it.  We discussed this is likely benign however given that it happened over the last several weeks, I am can order an ultrasound for further evaluation.  An order is placed and they will call her for the study.  She will follow-up in 2 weeks for a recheck, sooner if needed.  She is advised that her  can attend the prenatal visits.

## 2021-06-15 NOTE — TELEPHONE ENCOUNTER
Spoke with Dr. Lance and patient needs to present to [facility] Labor and Delivery     Please notify patient of this upon return phone call.

## 2021-06-15 NOTE — TELEPHONE ENCOUNTER
FYI patient was diverted to [facility] labor and delivery after she spoke with the charge nurse at [facility].

## 2021-06-15 NOTE — PROGRESS NOTES
30-year-old G1, P0 at 37 weeks and 3 days gestation with an uncomplicated pregnancy.  A positive blood type, planned delivery at [facility].  Discussed birth plan.  Her  Richard is present at the visit today.  She is aware of Covid restrictions in labor and delivery.  Unfortunately after this visit, her mother passed away.  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  Called and offered her support and is very difficult time.  We will continue to see her weekly in clinic.

## 2021-06-15 NOTE — TELEPHONE ENCOUNTER
D:  Dorie reports ROM,she reports clear fluid and no contractions. She is GBS (-)  A:  I called Dr. Lance, and obtained TORB that Iwona could stay home for a little bit longer this afternoon.  R:  Iwona will call me back in half and hour for update.

## 2021-06-15 NOTE — PROGRESS NOTES
30-year-old G1, P0 at 39 weeks and 3 days gestation, a positive blood type and GBS negative.  Pregnancy has been uncomplicated.  She plans on delivering at [facility].  She is not having any contractions currently.  No fluid leakage or blood-tinged discharge.  She is having good fetal movement, with larger movements as opposed to small kicks.  She is feeling more uncomfortable and ready to deliver anytime.  Reviewed when to go to labor and delivery.  Reviewed what to expect.  Follow-up in 1 week.

## 2021-06-15 NOTE — TELEPHONE ENCOUNTER
Spoke with Dr. Lance and patient needs to present to [facility] Labor and Delivery      Please notify patient of this upon return phone call.

## 2021-06-15 NOTE — PROGRESS NOTES
Pt presents to [facility] with reports of possible SROM.  Iwona reports that at 1230 today she noticed a 3 inch spot of wetness that soaked through her clothes.  She noted the fluid was clear.  She denies leaking any fluid since this time.  She reports her baby is moving as usual.  Iwona denies cramping or contractions at this time.  Rom+ sent.

## 2021-06-15 NOTE — PROGRESS NOTES
Healthy 30-year-old G1, P0 at 38 weeks and 3 days gestation, a positive blood type and GBS negative.  Uncomplicated pregnancy with plans to deliver at [facility].  They do not know the gender.  Her mother passed away 1 week ago from advanced Parkinson's.  She is grieving and she and her  are planning on moving in with her father which is where they will stay even after the delivery.  Follow-up weekly.  She is aware of when to arrive in labor and delivery.

## 2021-06-16 NOTE — PROGRESS NOTES
"  Assessment:        Iwona Hill is a 31 y.o. female here for postpartum exam at 6 weeks postpartum. Pap smear done at today's visit.   1. Postpartum care and examination  norethindrone (MICRONOR) 0.35 mg tablet    Ambulatory referral to Lactation   2. Routine postpartum follow-up     3. BCP (birth control pills) initiation     . .    Plan:        1. Doing well, referral to lactation for assistance with pumping  2. Contraception: oral progesterone-only contraceptive  3. Return in 1 year (on 4/15/2022).      Subjective:       Iwona Hill is a 31 y.o. female who presents for a postpartum visit. She is 6 weeks postpartum following a spontaneous vaginal delivery. I have fully reviewed the prenatal and intrapartum course. The delivery was at 40w3d gestational weeks. Outcome: spontaneous vaginal delivery. Postpartum course has been uncomplicated. Baby's course has been uncomplicated. Baby is feeding by breast with occ bottles. Bled for  4-5  weeks postpartum.  Currently bleeding  no bleeding. Bowel function is normal. Bladder function is normal. Patient has not resumed intercourse. Contraception method is none. Postpartum depression screening score: 3     The following portions of the patient's history were reviewed and updated as appropriate: allergies, current medications and problem list    Review of Systems  General:  Denies problem  Eyes: Denies problem  Ears/Nose/Throat: Denies problem  Cardiovascular: Denies problem  Respiratory:  Denies problem  Gastrointestinal:  Denies problem, Genitourinary: Denies problem  Musculoskeletal:  Denies problem  Skin: Denies problem  Neurologic: Denies problem  Psychiatric: Denies problem  Endocrine: Denies problem  Heme/Lymphatic: Denies problem   Allergic/Immunologic: Denies problem          Objective:         Vitals:    04/15/21 1019   BP: 120/80   Pulse: 70   SpO2: 96%   Weight: 147 lb (66.7 kg)   Height: 5' 6.73\" (1.695 m)       Physical Exam:  General " Appearance: Alert, cooperative, no distress, appears stated age  Head: Normocephalic, without obvious abnormality, atraumatic  Eyes: PERRL, conjunctiva/corneas clear, EOM's intact  Ears: Normal TM's and external ear canals, both ears  Nose: Nares normal, septum midline,mucosa normal, no drainage  Throat: Lips, mucosa, and tongue normal; teeth and gums normal  Neck: Supple, symmetrical, trachea midline, no adenopathy;  thyroid: not enlarged, symmetric, no tenderness/mass/nodules; no carotid bruit or JVD  Back: Symmetric, no curvature, ROM normal, no CVA tenderness  Lungs: Clear to auscultation bilaterally, respirations unlabored  Breasts: No breast masses, tenderness, asymmetry, or nipple discharge.  Heart: Regular rate and rhythm, S1 and S2 normal, no murmur, rub, or gallop, Abdomen: Soft, non-tender, bowel sounds active all four quadrants,  no masses, no organomegaly  Pelvic:Normally developed genitalia with no external lesions or eruptions. Vagina and cervix show no lesions, inflammation, discharge or tenderness. No cystocele, No rectocele. Uterus normal.  No adnexal mass or tenderness.    Extremities: Extremities normal, atraumatic, no cyanosis or edema  Skin: Skin color, texture, turgor normal, no rashes or lesions  Lymph nodes: Cervical, supraclavicular, and axillary nodes normal  Neurologic: Normal

## 2021-06-16 NOTE — PROGRESS NOTES
Assessment & Plan     Iwona was seen today for possible mastitis right breast.    Diagnoses and all orders for this visit:    Mastitis  -     cephalexin (KEFLEX) 500 MG capsule; Take 1 capsule (500 mg total) by mouth 4 (four) times a day for 7 days.    Exam is consistent with mastitis.  No evidence of abscess at this time.  Will initiate treatment with cephalexin 500 mg 4 times daily for 7 days.  Counseled her on use of medication and side effects.  Continue to breast-feed and ensure complete emptying of breasts.  May continue to use warm compresses and acetaminophen for discomfort.  Follow-up if not improving, worsening of symptoms or new concerns develop.             No follow-ups on file.    Qiana Barth MD  [facility]   Iwona Hill is 31 y.o. and presents today for the following health issues   HPI   She has concern for possible mastitis.  She is about 2 weeks postpartum.  She is breast-feeding exclusively.  For the past couple of days she has noticed an area of redness, warmth and discomfort on her right breast.  This has been associated with chills.  She has not had fever.  No body aches.  She has been taking Tylenol.  We reviewed her allergies and medications and updated the chart.  She has no other concerns today.          Review of Systems   All other systems reviewed and are negative.          Objective    /70 (Patient Site: Left Arm, Patient Position: Sitting, Cuff Size: Adult Regular)   Pulse 92   Temp 97.9  F (36.6  C) (Temporal)   Wt 152 lb 6 oz (69.1 kg)   LMP 05/25/2020 (Exact Date)   SpO2 97%   BMI 23.87 kg/m    Body mass index is 23.87 kg/m .  Physical Exam   Constitutional: She appears well-developed and well-nourished.   Breast exam: On right breast in the right upper quadrant is circular area of erythema, warmth and tenderness with palpation.  There is no fluctuance or significant induration

## 2021-06-17 NOTE — TELEPHONE ENCOUNTER
benton dropped off [facility] paperwork for Dr Guevara to complete for the care of her father Guevara Perez   761. please fax to number listed on post it and let patient know when it has been done. Form is place in dr guevara file in the front.

## 2021-06-18 NOTE — PATIENT INSTRUCTIONS - HE
Patient Instructions by Qiana Barth MD at 3/19/2021  3:00 PM     Author: Qiana Barth MD Service: -- Author Type: Physician    Filed: 3/19/2021  3:44 PM Encounter Date: 3/19/2021 Status: Signed    : Qiana Barth MD (Physician)         Patient Education     Mastitis  Mastitis occurs when breast tissue becomes swollen and inflamed. This is almost always due to infection. Mastitis most often affects breastfeeding women during the first 6 weeks after childbirth. For this reason, its also known as lactation mastitis. Infection may happen after a duct becomes clogged, causing milk to back up in the breast. Mastitis may also occur if bacteria enter the breast through small cracks in the nipple. (Less often, mastitis occurs in women who arent breastfeeding. If you have mastitis that is not due to breastfeeding, your healthcare provider will give you more information as needed. Treatment may include some of the same home care measures listed below.)  Mastitis may cause flu-like symptoms such as fever, aches, and fatigue. The affected breast may feel painful, warm, tender, firm, or swollen. The skin over the breast may be red (often in a wedge-shaped pattern). You may feel a burning a sensation when breastfeeding.  In most cases, mastitis can be treated with antibiotics. This should clear the infection. If treatment is delayed, a pocket of pus (abscess) can form in the breast tissue. A procedure may then be needed to drain the pus. In severe cases of infection, other treatments may be needed.  Home care  Breastfeeding    Its very important to keep the milk flowing from the infected breast. Continue breastfeeding from both breasts as usual. This will not hurt the baby. If this is too painful, use a breast pump to remove milk from the infected side. This can be fed to your baby or discarded. Note: If you don't continue to breastfeed or pump your breast, bacteria can grow in the milk that is left in your breast.  This can make your infection worse.    Tell your healthcare provider if you have problems with breastfeeding. He or she may suggest changes to your technique, if needed. You may also be referred to a lactation nurse or consultant for support with breastfeeding.  General care    Take any medicines youre prescribed as directed. If youre taking antibiotics, be sure to complete all of the medicine even if you start to feel better. Over-the-counter pain medicines may also be recommended. Dont use breast creams or other products or medicines without talking to your healthcare provider first. Note: If youre concerned about taking medicines while breastfeeding, talk to your healthcare provider.    Rest as often as needed. Also be sure to drink plenty of fluids.    To help relieve pain and swelling, heat or ice may be used. Apply as often as directed by your provider.  ? Heat: Place a warm compress on the breast. Use a towel soaked in hot water, a heating pad, or a hot water bottle.  ? Cold: Place a cold compress on the breast. Use an ice pack or bag of ice wrapped in a thin towel. Never place a cold source directly on the skin.  Follow-up care  Follow up with your healthcare provider as advised.  When to seek medical advice  Call your healthcare provider right away if any of these occur:    Fever of 100.4 F (38 C) or higher, or as directed by your provider    Shaking chills    Symptoms worsen or do not improve within 48 to 72 hours of starting treatment    New symptoms develop  Date Last Reviewed: 7/30/2015 2000-2017 [employer] [facility]. [address] [place] [address], [place] [address]. All rights reserved. This information is not intended as a substitute for professional medical care. Always follow your healthcare professional's instructions.

## 2021-06-20 NOTE — LETTER
Letter by Shauna Lance MD at      Author: Shauna Lance MD Service: -- Author Type: --    Filed:  Encounter Date: 9/8/2020 Status: (Other)         September 9, 2020     Patient: Iwona Hill   YOB: 1990   Date of Visit: 9/8/2020       To Whom It May Concern:    It is my medical opinion that Iwona Hill should lift no more than 10-15 lb due to complications of pregnancy.    If you have any questions or concerns, please don't hesitate to call.    Sincerely,        Electronically signed by Shauna Lance MD

## 2021-07-03 NOTE — ADDENDUM NOTE
Addendum Note by Viviana Beaulieu at 8/6/2020  2:50 PM     Author: Viviana Beaulieu Service: -- Author Type:     Filed: 8/7/2020  7:38 AM Encounter Date: 8/6/2020 Status: Signed    : Viviana Beaulieu ()    Addended by: VIVIANA BEAULIEU on: 8/7/2020 07:38 AM        Modules accepted: Orders

## 2021-07-14 PROBLEM — O44.40 LOW-LYING PLACENTA: Status: RESOLVED | Noted: 2020-10-15 | Resolved: 2021-01-31

## 2021-07-15 ENCOUNTER — HOSPITAL ENCOUNTER (OUTPATIENT)
Dept: MAMMOGRAPHY | Facility: CLINIC | Age: 31
End: 2021-07-15
Attending: FAMILY MEDICINE
Payer: COMMERCIAL

## 2021-07-15 ENCOUNTER — HOSPITAL ENCOUNTER (OUTPATIENT)
Dept: ULTRASOUND IMAGING | Facility: CLINIC | Age: 31
End: 2021-07-15
Attending: FAMILY MEDICINE
Payer: COMMERCIAL

## 2021-07-15 DIAGNOSIS — N64.52 BLOODY DISCHARGE FROM LEFT NIPPLE: ICD-10-CM

## 2021-07-15 PROCEDURE — 76642 ULTRASOUND BREAST LIMITED: CPT | Mod: LT

## 2021-07-15 PROCEDURE — 77062 BREAST TOMOSYNTHESIS BI: CPT

## 2021-08-15 ENCOUNTER — HEALTH MAINTENANCE LETTER (OUTPATIENT)
Age: 31
End: 2021-08-15

## 2021-10-10 ENCOUNTER — HEALTH MAINTENANCE LETTER (OUTPATIENT)
Age: 31
End: 2021-10-10

## 2021-11-23 ENCOUNTER — OFFICE VISIT (OUTPATIENT)
Dept: FAMILY MEDICINE | Facility: CLINIC | Age: 31
End: 2021-11-23
Payer: COMMERCIAL

## 2021-11-23 VITALS
BODY MASS INDEX: 20.8 KG/M2 | HEART RATE: 72 BPM | SYSTOLIC BLOOD PRESSURE: 122 MMHG | HEIGHT: 67 IN | OXYGEN SATURATION: 100 % | WEIGHT: 132.5 LBS | DIASTOLIC BLOOD PRESSURE: 84 MMHG

## 2021-11-23 DIAGNOSIS — Z30.09 BIRTH CONTROL COUNSELING: ICD-10-CM

## 2021-11-23 DIAGNOSIS — Z00.00 ROUTINE GENERAL MEDICAL EXAMINATION AT A HEALTH CARE FACILITY: Primary | ICD-10-CM

## 2021-11-23 DIAGNOSIS — L70.0 ACNE VULGARIS: ICD-10-CM

## 2021-11-23 PROCEDURE — 99395 PREV VISIT EST AGE 18-39: CPT | Performed by: FAMILY MEDICINE

## 2021-11-23 RX ORDER — LEVONORGESTREL/ETHIN.ESTRADIOL 0.1-0.02MG
1 TABLET ORAL DAILY
Qty: 84 TABLET | Refills: 4 | Status: SHIPPED | OUTPATIENT
Start: 2021-11-23 | End: 2022-07-28

## 2021-11-23 RX ORDER — CLINDAMYCIN PHOSPHATE 10 MG/G
GEL TOPICAL 2 TIMES DAILY
Qty: 60 G | Refills: 3 | Status: SHIPPED | OUTPATIENT
Start: 2021-11-23 | End: 2022-07-28

## 2021-11-23 ASSESSMENT — MIFFLIN-ST. JEOR: SCORE: 1357.52

## 2021-11-23 NOTE — PROGRESS NOTES
SUBJECTIVE:   CC: Iwona Hill is an 31 year old woman who presents for preventive health visit.     Healthy Habits:     Getting at least 3 servings of Calcium per day:  Yes    Bi-annual eye exam:  NO    Dental care twice a year:  Yes    Sleep apnea or symptoms of sleep apnea:  None    Diet:  Regular (no restrictions)    Frequency of exercise:  1 day/week    Duration of exercise:  Less than 15 minutes    Taking medications regularly:  Yes    Medication side effects:  None    PHQ-2 Total Score: 0    Additional concerns today:  No          PROBLEMS TO ADD ON...- NA    Today's PHQ-2 Score:   PHQ-2 ( 1999 Pfizer) 11/23/2021   Q1: Little interest or pleasure in doing things 0   Q2: Feeling down, depressed or hopeless 0   PHQ-2 Score 0   PHQ-2 Total Score (12-17 Years)- Positive if 3 or more points; Administer PHQ-A if positive -   Q1: Little interest or pleasure in doing things Not at all   Q2: Feeling down, depressed or hopeless Not at all   PHQ-2 Score 0       Abuse: Current or Past (Physical, Sexual or Emotional) - No  Do you feel safe in your environment? Yes    Have you ever done Advance Care Planning? (For example, a Health Directive, POLST, or a discussion with a medical provider or your loved ones about your wishes): No, advance care planning information given to patient to review.  Patient declined advance care planning discussion at this time.    Social History     Tobacco Use     Smoking status: Never Smoker     Smokeless tobacco: Never Used   Substance Use Topics     Alcohol use: Not Currently     If you drink alcohol do you typically have >3 drinks per day or >7 drinks per week? No    Alcohol Use 11/23/2021   Prescreen: >3 drinks/day or >7 drinks/week? No   Prescreen: >3 drinks/day or >7 drinks/week? -   No flowsheet data found.    Reviewed orders with patient.  Reviewed health maintenance and updated orders accordingly - Yes      Breast Cancer Screening:    FHS-7:   Breast CA Risk Assessment  (FHS-7) 7/15/2021 2021   Did any of your first-degree relatives have breast or ovarian cancer? No No   Did any of your relatives have bilateral breast cancer? Unknown No   Did any man in your family have breast cancer? No No   Did any woman in your family have breast and ovarian cancer? Yes No   Did any woman in your family have breast cancer before age 50 y? No No   Do you have 2 or more relatives with breast and/or ovarian cancer? No No   Do you have 2 or more relatives with breast and/or bowel cancer? No No       Patient under 40 years of age: Routine Mammogram Screening not recommended.   Pertinent mammograms are reviewed under the imaging tab.    History of abnormal Pap smear: NO - age 21-29 PAP every 3 years recommended  PAP / HPV Latest Ref Rng & Units 4/15/2021 10/14/2016   PAP Negative for squamous intraepithelial lesion or malignancy. Negative for squamous intraepithelial lesion or malignancy  Electronically signed by Arti Luis CT (ASCP) on 2021 at  1:56 PM   Negative for squamous intraepithelial lesion or malignancy  Electronically signed by Arti Luis CT (ASCP) on 10/24/2016 at  2:42 PM       Reviewed and updated as needed this visit by clinical staff  Tobacco  Allergies  Meds             Reviewed and updated as needed this visit by Provider    Meds            No past medical history on file.   Past Surgical History:   Procedure Laterality Date     HC REMOVAL OF TONSILS,<13 Y/O      Description: Tonsillectomy;  Proc Date: 10/01/2004;     OB History    Para Term  AB Living   1 1 1 0 0 1   SAB IAB Ectopic Multiple Live Births   0 0 0 0 1      # Outcome Date GA Lbr Jaime/2nd Weight Sex Delivery Anes PTL Lv   1 Term 21 40w3d 03:08 / 01:14 3.2 kg (7 lb 0.9 oz) F Vag-Spont None N ASAF      Name: JAYDEN,FEMALE-ALEXA      Apgar1: 8  Apgar5: 9       Review of Systems  CONSTITUTIONAL: NEGATIVE for fever, chills, change in weight  INTEGUMENTARU/SKIN:  "NEGATIVE for worrisome rashes, moles or lesions  EYES: NEGATIVE for vision changes or irritation  ENT: NEGATIVE for ear, mouth and throat problems  RESP: NEGATIVE for significant cough or SOB  BREAST: NEGATIVE for masses, tenderness or discharge  CV: NEGATIVE for chest pain, palpitations or peripheral edema  GI: NEGATIVE for nausea, abdominal pain, heartburn, or change in bowel habits  : NEGATIVE for unusual urinary or vaginal symptoms. Periods are regular.  MUSCULOSKELETAL: NEGATIVE for significant arthralgias or myalgia  NEURO: NEGATIVE for weakness, dizziness or paresthesias  PSYCHIATRIC: NEGATIVE for changes in mood or affect     OBJECTIVE:   /84 (BP Location: Right arm, Patient Position: Sitting, Cuff Size: Adult Regular)   Pulse 72   Ht 1.7 m (5' 6.93\")   Wt 60.1 kg (132 lb 8 oz)   LMP 11/08/2021   SpO2 100%   Breastfeeding No   BMI 20.80 kg/m    Physical Exam  GENERAL: healthy, alert and no distress  EYES: Eyes grossly normal to inspection, PERRL and conjunctivae and sclerae normal  HENT: ear canals and TM's normal, nose and mouth without ulcers or lesions  NECK: no adenopathy, no asymmetry, masses, or scars and thyroid normal to palpation  RESP: lungs clear to auscultation - no rales, rhonchi or wheezes  BREAST: normal without masses, tenderness or nipple discharge and no palpable axillary masses or adenopathy  CV: regular rate and rhythm, normal S1 S2, no S3 or S4, no murmur, click or rub, no peripheral edema and peripheral pulses strong  ABDOMEN: soft, nontender, no hepatosplenomegaly, no masses and bowel sounds normal  MS: no gross musculoskeletal defects noted, no edema  SKIN: no suspicious lesions or rashes  NEURO: Normal strength and tone, mentation intact and speech normal  PSYCH: mentation appears normal, affect normal/bright    Diagnostic Test Results:  Labs reviewed in Epic  No results found for this or any previous visit (from the past 24 hour(s)).    ASSESSMENT/PLAN:   Iwona was " "seen today for physical.    Diagnoses and all orders for this visit:    Routine general medical examination at a health care facility  - up to date on pap smear  -had a mammogram for bloody nipple discharge while pregnant and was negative, MGM with breast cancer.  Ok to start screening mammograms at age 40  -Using the OCP    Acne vulgaris  -     clindamycin (CLINDAMAX) 1 % external gel; Apply topically 2 times daily    Birth control counseling  -     levonorgestrel-ethinyl estradiol (AVIANE) 0.1-20 MG-MCG tablet; Take 1 tablet by mouth daily        Patient has been advised of split billing requirements and indicates understanding: Yes  COUNSELING:  Reviewed preventive health counseling, as reflected in patient instructions       Regular exercise       Healthy diet/nutrition       Contraception    Estimated body mass index is 20.8 kg/m  as calculated from the following:    Height as of this encounter: 1.7 m (5' 6.93\").    Weight as of this encounter: 60.1 kg (132 lb 8 oz).        She reports that she has never smoked. She has never used smokeless tobacco.      Counseling Resources:  ATP IV Guidelines  Pooled Cohorts Equation Calculator  Breast Cancer Risk Calculator  BRCA-Related Cancer Risk Assessment: FHS-7 Tool  FRAX Risk Assessment  ICSI Preventive Guidelines  Dietary Guidelines for Americans, 2010  USDA's MyPlate  ASA Prophylaxis  Lung CA Screening    Shauna Lance MD  [facility]  "

## 2022-01-26 ENCOUNTER — OFFICE VISIT (OUTPATIENT)
Dept: FAMILY MEDICINE | Facility: CLINIC | Age: 32
End: 2022-01-26
Payer: COMMERCIAL

## 2022-01-26 VITALS
TEMPERATURE: 98.5 F | SYSTOLIC BLOOD PRESSURE: 118 MMHG | OXYGEN SATURATION: 99 % | HEART RATE: 90 BPM | DIASTOLIC BLOOD PRESSURE: 70 MMHG

## 2022-01-26 DIAGNOSIS — J01.90 ACUTE SINUSITIS WITH SYMPTOMS > 10 DAYS: Primary | ICD-10-CM

## 2022-01-26 PROCEDURE — 99213 OFFICE O/P EST LOW 20 MIN: CPT | Performed by: FAMILY MEDICINE

## 2022-01-26 NOTE — PROGRESS NOTES
Assessment & Plan     Acute sinusitis with symptoms > 10 days  We will treat with Augmentin.  Counseled on use of medication and side effects.  Encouraged increased rest and fluids.  Okay to continue over-the-counter medications for symptom relief.  Also suggested fluticasone nasal spray in addition to what she has already been taking.  Notify us if symptoms worsening, not improving or new concerns develop.  - amoxicillin-clavulanate (AUGMENTIN) 875-125 MG tablet  Dispense: 20 tablet; Refill: 0                   No follow-ups on file.    Qiana Barth MD  [facility] FRANCOIS Bustillo is a 31 year old who presents for the following health issues     HPI   She is seen today for concern of a possible sinus infection.  She has been sick for over 2 weeks.  She has had cough that is productive of some mucus.  She has had sinus pressure, particularly in her right cheek area and around her right eye and some discomfort in her upper teeth on the right side.  She has had sore throat.  Has had yellowish nasal drainage and postnasal drainage.  Her ears are feeling okay.  She has not had fevers or chills.  She has been tired.  For last couple of days her eyes have been pink and she has had discharge from her eyes at night.  She does not wear contacts.  She has not had any shortness of breath but has had some lower rib pain from coughing.  She has been taking Mucinex and DayQuil and resting and drinking more fluids.  Symptoms are not getting better.  She did do a home Covid test on 1/19/2022 and this was negative.        Review of Systems         Objective    /70 (BP Location: Right arm, Cuff Size: Adult Regular)   Pulse 90   Temp 98.5  F (36.9  C) (Temporal)   SpO2 99%   There is no height or weight on file to calculate BMI.  Physical Exam   GENERAL: alert, no distress and fatigued  EYES: PERRL, EOMI and conjunctiva/corneas- conjunctival injection OU  HENT: normal cephalic/atraumatic,  ear canals and TM's normal, nasal mucosa edematous , oropharynx clear and oral mucous membranes moist  NECK: no adenopathy, no asymmetry, masses, or scars and thyroid normal to palpation  RESP: lungs clear to auscultation - no rales, rhonchi or wheezes  CV: regular rate and rhythm, normal S1 S2, no S3 or S4, no murmur, click or rub, no peripheral edema and peripheral pulses strong

## 2022-01-31 ENCOUNTER — LAB (OUTPATIENT)
Dept: FAMILY MEDICINE | Facility: CLINIC | Age: 32
End: 2022-01-31
Payer: COMMERCIAL

## 2022-01-31 DIAGNOSIS — Z20.822 SUSPECTED COVID-19 VIRUS INFECTION: ICD-10-CM

## 2022-01-31 LAB — SARS-COV-2 RNA RESP QL NAA+PROBE: NEGATIVE

## 2022-01-31 PROCEDURE — U0005 INFEC AGEN DETEC AMPLI PROBE: HCPCS

## 2022-01-31 PROCEDURE — U0003 INFECTIOUS AGENT DETECTION BY NUCLEIC ACID (DNA OR RNA); SEVERE ACUTE RESPIRATORY SYNDROME CORONAVIRUS 2 (SARS-COV-2) (CORONAVIRUS DISEASE [COVID-19]), AMPLIFIED PROBE TECHNIQUE, MAKING USE OF HIGH THROUGHPUT TECHNOLOGIES AS DESCRIBED BY CMS-2020-01-R: HCPCS

## 2022-05-16 ENCOUNTER — NURSE TRIAGE (OUTPATIENT)
Dept: NURSING | Facility: CLINIC | Age: 32
End: 2022-05-16
Payer: COMMERCIAL

## 2022-05-16 NOTE — TELEPHONE ENCOUNTER
Symptoms.    Mariya  has pain in lower left abdomen. Comes and goes, especially after period.  Dull pain causes a lot of discomfort  Bloating pressure when she sits on her bottom, and her abdomen.    Pain during intercourse.in lower left abdomen as well.  No bleeding after intercourse.  Patient states she is trying to get pregnant.  Janny Wiley RN   [facility] Nurse Advisor        Reason for Disposition    MODERATE OR MILD pain that comes and goes (cramps) lasts > 24 hours    Patient wants to be seen    Additional Information    Negative: Unusual vaginal discharge    Negative: Age > 60 years    Negative: Vomiting and abdomen looks much more swollen than usual    Negative: White of the eyes have turned yellow (i.e., jaundice)    Negative: Blood in urine (red, pink, or tea-colored)    Negative: Fever > 103 F (39.4 C)    Negative: Fever > 101 F (38.3 C) and over 60 years of age    Negative: Fever > 100.0 F (37.8 C) and has diabetes mellitus or a weak immune system (e.g., HIV positive, cancer chemotherapy, organ transplant, splenectomy, chronic steroids)    Negative: Fever > 100.0 F (37.8 C) and bedridden (e.g., nursing home patient, stroke, chronic illness, recovering from surgery)    Negative: Pregnant or could be pregnant (i.e., missed last menstrual period)    Negative: Constant abdominal pain lasting > 2 hours    Negative: Vomiting bile (green color)    Negative: Patient sounds very sick or weak to the triager    Protocols used: ABDOMINAL PAIN - FEMALE-A-OH

## 2022-07-28 ENCOUNTER — OFFICE VISIT (OUTPATIENT)
Dept: FAMILY MEDICINE | Facility: CLINIC | Age: 32
End: 2022-07-28
Payer: COMMERCIAL

## 2022-07-28 VITALS
HEART RATE: 71 BPM | SYSTOLIC BLOOD PRESSURE: 110 MMHG | WEIGHT: 131.6 LBS | DIASTOLIC BLOOD PRESSURE: 60 MMHG | TEMPERATURE: 98 F | OXYGEN SATURATION: 99 % | RESPIRATION RATE: 16 BRPM | BODY MASS INDEX: 20.66 KG/M2

## 2022-07-28 DIAGNOSIS — N91.2 AMENORRHEA: Primary | ICD-10-CM

## 2022-07-28 DIAGNOSIS — Z32.01 PREGNANCY TEST POSITIVE: ICD-10-CM

## 2022-07-28 LAB — HCG UR QL: POSITIVE

## 2022-07-28 PROCEDURE — 99214 OFFICE O/P EST MOD 30 MIN: CPT | Performed by: NURSE PRACTITIONER

## 2022-07-28 PROCEDURE — 81025 URINE PREGNANCY TEST: CPT | Performed by: NURSE PRACTITIONER

## 2022-07-28 ASSESSMENT — PAIN SCALES - GENERAL: PAINLEVEL: NO PAIN (0)

## 2022-07-28 NOTE — PROGRESS NOTES
Assessment and Plan:       ICD-10-CM    1. Amenorrhea  N91.2 HCG qualitative urine POCT, Enter/Edit Result     HCG qualitative urine     HCG qualitative urine   2. Pregnancy test positive  Z32.01      Urine pregnancy test ordered and is positive.  According to her last menstrual period, she is currently 4 weeks, 3 days gestation with an estimated due date of 4/3/22  Patient plans on establishing OB care with Dr. Lance  She will continue to take her prenatal multivitamin with DHA.  Discussed avoidance of smoking, alcohol, drugs.  She is to avoid contact sports to the abdomen.  Discussed consuming a healthy diet and exercising.  Provided handouts on safe medications to take during pregnancy.  OB packet provided.  Will obtain OB ultrasound to date the pregnancy.  She is content with the plan.      Subjective:     Iwona is a 32 year old female presenting to the clinic for a pregnancy confirmation.  Patient's last menstrual period was on 6/27/22.  She has been  for 4 years.  This is a planned pregnancy.  This is her second pregnancy.  She has a daughter who is 17 months old who was born vaginally without complications.  She is taking a prenatal multivitamin.  She denies smoking, alcohol, drugs.  She denies fatigue, breast tenderness, nausea, vomiting.  She had a positive at-home pregnancy test on Sunday.    Answers for HPI/ROS submitted by the patient on 7/28/2022  What is the reason for your visit today? : Confirm pregnancy  How many servings of fruits and vegetables do you eat daily?: 2-3  On average, how many sweetened beverages do you drink each day (Examples: soda, juice, sweet tea, etc.  Do NOT count diet or artificially sweetened beverages)?: 1  How many minutes a day do you exercise enough to make your heart beat faster?: 9 or less  How many days a week do you exercise enough to make your heart beat faster?: 3 or less  How many days per week do you miss taking your medication?: 1    Reviewof Systems:  A complete 14 point review of systems was obtained and is negative or as stated in the history of present illness.    Social History     Socioeconomic History     Marital status: Single     Spouse name: Not on file     Number of children: Not on file     Years of education: Not on file     Highest education level: Not on file   Occupational History     Not on file   Tobacco Use     Smoking status: Never Smoker     Smokeless tobacco: Never Used   Substance and Sexual Activity     Alcohol use: Not Currently     Drug use: Never     Sexual activity: Not on file   Other Topics Concern     Not on file   Social History Narrative     Not on file     Social Determinants of Health     Financial Resource Strain: Not on file   Food Insecurity: Not on file   Transportation Needs: Not on file   Physical Activity: Not on file   Stress: Not on file   Social Connections: Not on file   Intimate Partner Violence: Not on file   Housing Stability: Not on file       Active Ambulatory Problems     Diagnosis Date Noted     No Active Ambulatory Problems     Resolved Ambulatory Problems     Diagnosis Date Noted     Neck pain 03/05/2020     Tension headache 03/05/2020     Oral contraceptive use 10/14/2016     Low-lying placenta 10/15/2020     No Additional Past Medical History       Family History   Problem Relation Age of Onset     Hypertension Mother      Parkinsonism Mother      Breast Cancer Maternal Grandmother      Cerebrovascular Disease Maternal Grandfather      Alzheimer Disease Maternal Grandfather      Heart Disease Paternal Grandmother      Diabetes Type 2  Paternal Grandmother      Cerebrovascular Disease Paternal Uncle      Diabetes Type 2  Paternal Uncle      CABG Paternal Uncle      Psoriasis Father        Objective:     /60 (BP Location: Right arm, Patient Position: Sitting, Cuff Size: Adult Regular)   Pulse 71   Temp 98  F (36.7  C)   Resp 16   Wt 59.7 kg (131 lb 9.6 oz)   LMP 06/27/2022   SpO2 99%   BMI 20.66  kg/m      Patient is alert, in no obvious distress.   Skin: Warm, dry.    Lungs:  Clear to auscultation. Respirations even and unlabored.  No wheezing or rales noted.   Heart:  Regular rate and rhythm.  No murmurs, S3, S4, gallops, or rubs.    Abdomen: Soft, nontender.  No organomegaly. Bowel sounds normoactive. No guarding or masses noted.      LABORATORY: Urine pregnancy test ordered and is positive.

## 2022-08-16 ENCOUNTER — NURSE TRIAGE (OUTPATIENT)
Dept: NURSING | Facility: CLINIC | Age: 32
End: 2022-08-16

## 2022-08-16 DIAGNOSIS — O21.0 HYPEREMESIS GRAVIDARUM: Primary | ICD-10-CM

## 2022-08-16 RX ORDER — METOCLOPRAMIDE 5 MG/1
5 TABLET ORAL 3 TIMES DAILY PRN
Qty: 30 TABLET | Refills: 1 | Status: SHIPPED | OUTPATIENT
Start: 2022-08-16 | End: 2022-10-17

## 2022-08-16 NOTE — TELEPHONE ENCOUNTER
Called patient to discuss symptoms.  She is currently 6 to 7 weeks pregnant.  She states last night she threw up 4 times but she has been able to keep some fluids down today, its been a little bit better.  Recommended we start metoclopramide 3 times daily for nausea.  Encourage her to drink small amounts of liquids frequently and let me know if symptoms do not improve.

## 2022-08-16 NOTE — TELEPHONE ENCOUNTER
Triage call  Patient calling to report morning sickness symptoms.tarted a weak ago but now for a day and a half she can not keep anything down and and just looking at food makes her nauseous.  She did void this morning but her urine is getting cloudy and darker. She has tried the  Leanne root tea, sea bands, tums without any help. She is 6-7 weeks pregnant.    Per protocol go to ED/UCC or to office with PCP approval.  Care advice given.  Verbalizes understanding and agrees with plan.  Routing to provider.    Delia Malik RN   [facility] Nurse Advisor  8:14 AM 8/16/2022    COVID 19 Nurse Triage Plan/Patient Instructions    Please be aware that novel coronavirus (COVID-19) may be circulating in the community. If you develop symptoms such as fever, cough, or SOB or if you have concerns about the presence of another infection including coronavirus (COVID-19), please contact your health care provider or visit [email or web address].     Disposition/Instructions    In-Person Visit with provider recommended. Reference Visit Selection Guide.    Thank you for taking steps to prevent the spread of this virus.  o Limit your contact with others.  o Wear a simple mask to cover your cough.  o Wash your hands well and often.    Resources    [facility]: About COVID-19: [email or web address]    CDC: What to Do If You're Sick: www.cdc.gov/coronavirus/2019-ncov/about/steps-when-sick.html    CDC: Ending Home Isolation: www.cdc.gov/coronavirus/2019-ncov/hcp/disposition-in-home-patients.html     CDC: Caring for Someone: www.cdc.gov/coronavirus/2019-ncov/if-you-are-sick/care-for-someone.html     [facility]: Interim Guidance for Hospital Discharge to Home: www.health.[facility].[place]/diseases/coronavirus/hcp/hospdischarge.pdf    [facility] clinical trials (COVID-19 research studies): [email or web address]-trials     Below are the COVID-19 hotlines at the [place] Department of Health  ([facility]). Interpreters are available.   o For health questions: Call 831-377-2191 or 1-569.918.3858 (7 a.m. to 7 p.m.)  o For questions about schools and childcare: Call 931-806-1045 or 1-488.375.7969 (7 a.m. to 7 p.m.)                       Reason for Disposition    Unable to keep ANY liquids down (without vomiting) this past 24 hours    Additional Information    Negative: Sounds like a life-threatening emergency to the triager    Negative: Vaginal bleeding and pregnant < 20 weeks    Negative: Abdominal pain and pregnant < 20 weeks    Negative: Vomiting did NOT begin in early pregnancy (i.e., 4th-8th week of pregnancy) OR 20 or more weeks pregnant at time of call    Negative: Headache is main symptom    Negative: Vomiting red blood or black (coffee ground) material    Negative: Insulin-dependent diabetes (Type I) and glucose > 400 mg/dL (22 mmol/L)    Negative: Recent head injury (within last 3 days)    Negative: Recent abdominal injury (within last 3 days)    Negative: Severe pain in one eye    Negative: SEVERE vomiting (e.g., > 6 times / day) and present > 8 hours    Protocols used: PREGNANCY - MORNING SICKNESS (NAUSEA AND VOMITING OF PREGNANCY)-A-[place]

## 2022-08-18 ENCOUNTER — HOSPITAL ENCOUNTER (OUTPATIENT)
Dept: ULTRASOUND IMAGING | Facility: CLINIC | Age: 32
Discharge: HOME | End: 2022-08-18
Attending: NURSE PRACTITIONER | Admitting: NURSE PRACTITIONER
Payer: COMMERCIAL

## 2022-08-18 DIAGNOSIS — Z32.01 PREGNANCY TEST POSITIVE: ICD-10-CM

## 2022-08-18 DIAGNOSIS — N91.2 AMENORRHEA: ICD-10-CM

## 2022-08-18 PROCEDURE — 76801 OB US < 14 WKS SINGLE FETUS: CPT

## 2022-08-19 ENCOUNTER — TELEPHONE (OUTPATIENT)
Dept: FAMILY MEDICINE | Facility: CLINIC | Age: 32
End: 2022-08-19

## 2022-08-19 NOTE — TELEPHONE ENCOUNTER
----- Message from SALLY Riggs CNP sent at 8/19/2022 12:52 PM CDT -----  Please notify the patient that her ultrasound showed that she was 8 weeks 1 day along at the time of the ultrasound with an estimated due date of 3/29/23.  Please assist her with scheduling her first OB with Dr. Lance.  Thanks.

## 2022-08-19 NOTE — TELEPHONE ENCOUNTER
Attempted to contact patient. Left message for patient to call back.     Please relay message to patient when she calls back.

## 2022-08-22 ENCOUNTER — TELEPHONE (OUTPATIENT)
Dept: FAMILY MEDICINE | Facility: CLINIC | Age: 32
End: 2022-08-22

## 2022-08-22 NOTE — TELEPHONE ENCOUNTER
I called pt and left a vm letting her know that I spoke with Dr. Lance. We can see both pt and daughter same day/ time of pt's appt on 9/8 at 4:30PM.   Daughter has been rescheduled.

## 2022-08-22 NOTE — TELEPHONE ENCOUNTER
Patient called wanting to reschedule an appt.     Her daughter, Shelby, is being seen on 09/06 for her 18 mo WCC. Patient is scheduled for initial OB visit on 09/08 with Dr. Lanec.     Patient is wondering if it is possible to have them be seen back to back to reduce driving to clinic 2x that week since she does not live close.     Was thinking possibly scheduling the WCC at the end of the day on Thursday with her initial OB visit but informed patient that Loretta would give her a call back to confirm whether or not this would be okay.     Callback number: [phone].

## 2022-09-07 LAB
ABO/RH(D): NORMAL
ANTIBODY SCREEN: NEGATIVE
SPECIMEN EXPIRATION DATE: NORMAL

## 2022-09-08 ENCOUNTER — PRENATAL OFFICE VISIT (OUTPATIENT)
Dept: FAMILY MEDICINE | Facility: CLINIC | Age: 32
End: 2022-09-08
Payer: COMMERCIAL

## 2022-09-08 VITALS
TEMPERATURE: 97.4 F | HEIGHT: 67 IN | DIASTOLIC BLOOD PRESSURE: 64 MMHG | SYSTOLIC BLOOD PRESSURE: 116 MMHG | BODY MASS INDEX: 21 KG/M2 | OXYGEN SATURATION: 98 % | HEART RATE: 83 BPM | WEIGHT: 133.8 LBS

## 2022-09-08 DIAGNOSIS — Z34.81 ENCOUNTER FOR SUPERVISION OF OTHER NORMAL PREGNANCY IN FIRST TRIMESTER: Primary | ICD-10-CM

## 2022-09-08 LAB
ERYTHROCYTE [DISTWIDTH] IN BLOOD BY AUTOMATED COUNT: 12.7 % (ref 10–15)
HCT VFR BLD AUTO: 40.6 % (ref 35–47)
HGB BLD-MCNC: 13.4 G/DL (ref 11.7–15.7)
MCH RBC QN AUTO: 28.9 PG (ref 26.5–33)
MCHC RBC AUTO-ENTMCNC: 33 G/DL (ref 31.5–36.5)
MCV RBC AUTO: 88 FL (ref 78–100)
PLATELET # BLD AUTO: 178 10E3/UL (ref 150–450)
RBC # BLD AUTO: 4.64 10E6/UL (ref 3.8–5.2)
WBC # BLD AUTO: 8.7 10E3/UL (ref 4–11)

## 2022-09-08 PROCEDURE — 86762 RUBELLA ANTIBODY: CPT | Performed by: FAMILY MEDICINE

## 2022-09-08 PROCEDURE — 85027 COMPLETE CBC AUTOMATED: CPT | Performed by: FAMILY MEDICINE

## 2022-09-08 PROCEDURE — 86901 BLOOD TYPING SEROLOGIC RH(D): CPT | Performed by: FAMILY MEDICINE

## 2022-09-08 PROCEDURE — 87491 CHLMYD TRACH DNA AMP PROBE: CPT | Performed by: FAMILY MEDICINE

## 2022-09-08 PROCEDURE — 36415 COLL VENOUS BLD VENIPUNCTURE: CPT | Performed by: FAMILY MEDICINE

## 2022-09-08 PROCEDURE — 86900 BLOOD TYPING SEROLOGIC ABO: CPT | Performed by: FAMILY MEDICINE

## 2022-09-08 PROCEDURE — 87389 HIV-1 AG W/HIV-1&-2 AB AG IA: CPT | Performed by: FAMILY MEDICINE

## 2022-09-08 PROCEDURE — 99207 PR FIRST OB VISIT: CPT | Performed by: FAMILY MEDICINE

## 2022-09-08 PROCEDURE — 86850 RBC ANTIBODY SCREEN: CPT | Performed by: FAMILY MEDICINE

## 2022-09-08 PROCEDURE — 86780 TREPONEMA PALLIDUM: CPT | Performed by: FAMILY MEDICINE

## 2022-09-08 PROCEDURE — 87340 HEPATITIS B SURFACE AG IA: CPT | Performed by: FAMILY MEDICINE

## 2022-09-08 PROCEDURE — 99213 OFFICE O/P EST LOW 20 MIN: CPT | Performed by: FAMILY MEDICINE

## 2022-09-08 ASSESSMENT — PAIN SCALES - GENERAL: PAINLEVEL: NO PAIN (0)

## 2022-09-09 LAB
C TRACH DNA SPEC QL NAA+PROBE: NEGATIVE
HBV SURFACE AG SERPL QL IA: NONREACTIVE
HIV 1+2 AB+HIV1 P24 AG SERPL QL IA: NONREACTIVE
RUBV IGG SERPL QL IA: 8.1 INDEX
RUBV IGG SERPL QL IA: POSITIVE
T PALLIDUM AB SER QL: NONREACTIVE

## 2022-09-14 LAB — SCANNED LAB RESULT: NORMAL

## 2022-09-16 ENCOUNTER — NURSE TRIAGE (OUTPATIENT)
Dept: NURSING | Facility: CLINIC | Age: 32
End: 2022-09-16

## 2022-09-16 DIAGNOSIS — O21.9 NAUSEA AND VOMITING DURING PREGNANCY: Primary | ICD-10-CM

## 2022-09-16 RX ORDER — ONDANSETRON 4 MG/1
4 TABLET, ORALLY DISINTEGRATING ORAL EVERY 8 HOURS PRN
Qty: 20 TABLET | Refills: 1 | Status: SHIPPED | OUTPATIENT
Start: 2022-09-16 | End: 2022-10-17

## 2022-09-16 NOTE — PROGRESS NOTES
"  SUBJECTIVE:     HPI:    This is a 32 year old female patient,  who presents for her first obstetrical visit.    JESSICA: 3/29/2023, by Ultrasound.  She is 12w2d weeks.  Her cycles are regular.  Her last menstrual period was normal.   Since her LMP, she has experienced  nausea and fatigue).   She denies headache, dysuria and vaginal bleeding.    Additional History: none      HISTORY:   Planned Pregnancy: Yes  Marital Status: - Osmin  Occupation:[employer]  Living in Household: Spouse and Children    Past History:  Her past medical history is non-contributory.      She has a history of  uncomplicated     Since her last LMP she denies use of alcohol, tobacco and street drugs.    Past medical, surgical, social and family history were reviewed and updated in [employer].      Current Outpatient Medications   Medication     metoclopramide (REGLAN) 5 MG tablet     Prenatal Vit-Fe Fumarate-FA (PRENATAL PO)     ondansetron (ZOFRAN ODT) 4 MG ODT tab     No current facility-administered medications for this visit.       ROS:   12 point review of systems negative other than symptoms noted below or in the HPI.      OBJECTIVE:     EXAM:  /64 (BP Location: Left arm, Patient Position: Sitting, Cuff Size: Adult Regular)   Pulse 83   Temp 97.4  F (36.3  C) (Temporal)   Ht 1.69 m (5' 6.54\")   Wt 60.7 kg (133 lb 12.8 oz)   LMP 2022   SpO2 98%   Breastfeeding No   BMI 21.25 kg/m   Body mass index is 21.25 kg/m .    GENERAL: healthy, alert and no distress  EYES: Eyes grossly normal to inspection, PERRL and conjunctivae and sclerae normal  HENT: ear canals and TM's normal, nose and mouth without ulcers or lesions  NECK: no adenopathy, no asymmetry, masses, or scars and thyroid normal to palpation  RESP: lungs clear to auscultation - no rales, rhonchi or wheezes  BREAST: normal without masses, tenderness or nipple discharge and no palpable axillary masses or adenopathy  CV: regular rate and rhythm, " normal S1 S2, no S3 or S4, no murmur, click or rub, no peripheral edema and peripheral pulses strong  ABDOMEN: soft, nontender, no hepatosplenomegaly, no masses and bowel sounds normal   (female): normal female external genitalia, normal urethral meatus, vaginal mucosa pink, moist, well rugated, and normal cervix/adnexa/uterus without masses or discharge  MS: no gross musculoskeletal defects noted, no edema  SKIN: no suspicious lesions or rashes  NEURO: Normal strength and tone, mentation intact and speech normal  PSYCH: mentation appears normal, affect normal/bright    ASSESSMENT/PLAN:       ICD-10-CM    1. Encounter for supervision of other normal pregnancy in first trimester  Z34.81 ABO/Rh type and screen     Hepatitis B surface antigen     CBC with platelets     HIV Antigen Antibody Combo     Rubella Antibody IgG     Treponema Abs w Reflex to RPR and Titer     Urine Culture Aerobic Bacterial     Non Invasive Prenatal Test Cell Free DNA     Chlamydia trachomatis PCR     Neisseria gonorrhoeae PCR     ABO/Rh type and screen     Hepatitis B surface antigen     CBC with platelets     HIV Antigen Antibody Combo     Rubella Antibody IgG     Treponema Abs w Reflex to RPR and Titer     Non Invasive Prenatal Test Cell Free DNA     Chlamydia trachomatis PCR       32 year old , 12w2d weeks of pregnancy with JESSICA of 3/29/2023, by Ultrasound    Discussed as follows:first trimester education, home remedies for nausea. Genetic counseling- [facility] testing ordered with election to learn gender    Counseling given:   - Follow up in 4-6 weeks for return OB visit.  - Recommended weight gain for pregnancy: 25-35 lbs.         PLAN/PATIENT INSTRUCTIONS:  Continue with prenatal  Follow up in 1 month    Shauna Lance MD

## 2022-09-16 NOTE — TELEPHONE ENCOUNTER
"Nurse Triage SBAR    Is this a 2nd Level Triage?  Yes    Situation:   12 weeks pregnant and has had a cold since Sunday     Also cough is creating a more intense gag reflex and she cannot keep anything down  -Last was able to eat and keep it down on Wednesday lunch  -\"Basically I start coughing and it makes me vomit\" but cannot keep anything down for more than a 1/2 hour due to frequent coughing fits.     She is getting Lightheaded when standing up     Background:     The last time she was struggling with keeping anything down, they had her come to hospital to receive IV fluids.   -Questions at what point she needs to come in     Patient is also wondering which medications for cold or cough that she can take during pregnancy     OB is Dr. MAURILIO Lance   -[facility]    Assessment:   -Headache   -Vomiting  -Cough   -Sore throat   -Stuffy nose   -Body aches     Yesterday she vomited 5+ times    Last urinated last night between 7-8pm    No vaginal bleeding  No abdominal pain   No fever  No difficulty breathing       Protocol Recommended Disposition:   Go to ED Now (Or PCP Triage)    Recommendation:     Routed to provider and Care Connection Pool    Does the patient meet one of the following criteria for ADS visit consideration? 16+ years old, with an [facility] PCP     TIP  Providers, please consider if this condition is appropriate for management at one of our Acute and Diagnostic Services sites.     If patient is a good candidate, please use dotphrase <dot>triageresponse and select Refer to ADS to document.    Reason for Disposition    [1] Drinking very little AND [2] dehydration suspected (e.g., no urine > 12 hours, very dry mouth, very lightheaded)    [1] SEVERE vomiting (e.g., 6 or more times/day) AND [2] present > 8 hours    Additional Information    Negative: Sounds like a life-threatening emergency to the triager    Negative: [1] Vaginal bleeding AND [2] pregnant < 20 weeks    Negative: [1] Abdominal pain AND " "[2] pregnant < 20 weeks    Negative: Headache is main symptom    Negative: [1] Vomiting did NOT begin in early pregnancy (i.e., 4th-8th week of pregnancy) OR [2] 20 or more weeks pregnant at time of call    Negative: [1] Vomiting AND [2] contains red blood or black (\"coffee ground\") material  (Exception: few red streaks in vomit that only happened once)    Negative: [1] Insulin-dependent diabetes (Type I) AND [2] glucose > 400 mg/dl (22 mmol/l)    Negative: Recent head injury (within last 3 days)    Negative: Recent abdominal injury (within last 3 days)    Negative: Severe pain in one eye    Protocols used: PREGNANCY - MORNING SICKNESS (NAUSEA AND VOMITING OF PREGNANCY)--    Hazel eMdley RN on 9/16/2022 at 7:46 AM    "

## 2022-09-16 NOTE — TELEPHONE ENCOUNTER
Safe meds for cold in pregnancy listed below. Will send in zofran to help with nausea. Pt should go to ER if she is feeling significant dizziness/weakness or not urinating.    CONGESTION AND COLDS    antihistamines - zyrtec, claritin, Benadryl  -saline nasal spray or netipot    Vicks Vapor Rub    Cough Drops  Avoid Robitussin and Mucinex in 1st trimester but otherwise safe during rest of pregnancy  Avoid pseudoephedrine     Dr Feritas

## 2022-09-18 ENCOUNTER — HEALTH MAINTENANCE LETTER (OUTPATIENT)
Age: 32
End: 2022-09-18

## 2022-10-17 ENCOUNTER — PRENATAL OFFICE VISIT (OUTPATIENT)
Dept: FAMILY MEDICINE | Facility: CLINIC | Age: 32
End: 2022-10-17
Payer: COMMERCIAL

## 2022-10-17 VITALS
BODY MASS INDEX: 22.03 KG/M2 | HEART RATE: 72 BPM | SYSTOLIC BLOOD PRESSURE: 108 MMHG | OXYGEN SATURATION: 99 % | TEMPERATURE: 97.5 F | DIASTOLIC BLOOD PRESSURE: 62 MMHG | WEIGHT: 138.7 LBS

## 2022-10-17 DIAGNOSIS — Z3A.16 16 WEEKS GESTATION OF PREGNANCY: Primary | ICD-10-CM

## 2022-10-17 PROCEDURE — 81511 FTL CGEN ABNOR FOUR ANAL: CPT | Mod: 90 | Performed by: FAMILY MEDICINE

## 2022-10-17 PROCEDURE — 90471 IMMUNIZATION ADMIN: CPT | Performed by: FAMILY MEDICINE

## 2022-10-17 PROCEDURE — 99207 PR PRENATAL VISIT: CPT | Performed by: FAMILY MEDICINE

## 2022-10-17 PROCEDURE — 90686 IIV4 VACC NO PRSV 0.5 ML IM: CPT | Performed by: FAMILY MEDICINE

## 2022-10-17 PROCEDURE — 99000 SPECIMEN HANDLING OFFICE-LAB: CPT | Performed by: FAMILY MEDICINE

## 2022-10-17 PROCEDURE — 36415 COLL VENOUS BLD VENIPUNCTURE: CPT | Performed by: FAMILY MEDICINE

## 2022-10-17 ASSESSMENT — PAIN SCALES - GENERAL: PAINLEVEL: NO PAIN (0)

## 2022-10-19 LAB
# FETUSES US: NORMAL
AFP MOM SERPL: 1.63
AFP SERPL-MCNC: 65 NG/ML
AGE - REPORTED: 31 YR
CURRENT SMOKER: NO
FAMILY MEMBER DISEASES HX: NO
GA METHOD: NORMAL
GA: NORMAL WK
HCG MOM SERPL: 0.74
HCG SERPL-ACNC: NORMAL IU/L
HX OF HEREDITARY DISORDERS: NO
IDDM PATIENT QL: NO
INHIBIN A MOM SERPL: 1.36
INHIBIN A SERPL-MCNC: 220 PG/ML
INTEGRATED SCN PATIENT-IMP: NORMAL
PATHOLOGY STUDY: NORMAL
SPECIMEN DRAWN SERPL: NORMAL
U ESTRIOL MOM SERPL: 0.51
U ESTRIOL SERPL-MCNC: 0.64 NG/ML

## 2022-10-20 NOTE — PROGRESS NOTES
32-year-old G2, P1 at 16 weeks and 5 days gestation with a EDC of March 29, 2023 and a positive blood type.  She had an uncomplicated first pregnancy and had a vaginal delivery with a baby girl  Invitae studies are normal and show that she is expecting another baby girl with this pregnancy.  AFP testing is done today  No bleeding with some occasional cramping.  20-week fetal survey is ordered  Follow-up in 1 month

## 2022-11-09 ENCOUNTER — HOSPITAL ENCOUNTER (OUTPATIENT)
Dept: ULTRASOUND IMAGING | Facility: CLINIC | Age: 32
Discharge: HOME | End: 2022-11-09
Attending: FAMILY MEDICINE | Admitting: FAMILY MEDICINE
Payer: COMMERCIAL

## 2022-11-09 DIAGNOSIS — Z3A.16 16 WEEKS GESTATION OF PREGNANCY: ICD-10-CM

## 2022-11-09 PROCEDURE — 76805 OB US >/= 14 WKS SNGL FETUS: CPT

## 2022-11-10 ENCOUNTER — PRENATAL OFFICE VISIT (OUTPATIENT)
Dept: FAMILY MEDICINE | Facility: CLINIC | Age: 32
End: 2022-11-10
Payer: COMMERCIAL

## 2022-11-10 VITALS
WEIGHT: 140 LBS | HEART RATE: 78 BPM | OXYGEN SATURATION: 99 % | BODY MASS INDEX: 22.23 KG/M2 | TEMPERATURE: 97.6 F | SYSTOLIC BLOOD PRESSURE: 100 MMHG | DIASTOLIC BLOOD PRESSURE: 58 MMHG

## 2022-11-10 DIAGNOSIS — Z34.90 PREGNANCY, UNSPECIFIED GESTATIONAL AGE: Primary | ICD-10-CM

## 2022-11-10 PROCEDURE — 99207 PR PRENATAL VISIT: CPT | Performed by: FAMILY MEDICINE

## 2022-11-10 ASSESSMENT — PAIN SCALES - GENERAL: PAINLEVEL: NO PAIN (0)

## 2022-11-10 NOTE — PROGRESS NOTES
32-year-old  at 20 weeks and 1 day gestation with an EDC of 3/29/2023 and a positive blood type  Uncomplicated pregnancy.  Reviewed normal fetal survey  Normal Invitae, they are expecting a baby girl.  Feeling good fetal movement, no bleeding or cramping.  Follow-up in 1 month.

## 2022-12-07 ENCOUNTER — PRENATAL OFFICE VISIT (OUTPATIENT)
Dept: FAMILY MEDICINE | Facility: CLINIC | Age: 32
End: 2022-12-07
Payer: COMMERCIAL

## 2022-12-07 VITALS
DIASTOLIC BLOOD PRESSURE: 70 MMHG | HEART RATE: 74 BPM | BODY MASS INDEX: 23.39 KG/M2 | WEIGHT: 147.3 LBS | OXYGEN SATURATION: 99 % | SYSTOLIC BLOOD PRESSURE: 104 MMHG | TEMPERATURE: 98.1 F

## 2022-12-07 DIAGNOSIS — Z34.82 ENCOUNTER FOR SUPERVISION OF OTHER NORMAL PREGNANCY IN SECOND TRIMESTER: Primary | ICD-10-CM

## 2022-12-07 PROCEDURE — 99207 PR PRENATAL VISIT: CPT | Performed by: FAMILY MEDICINE

## 2022-12-07 PROCEDURE — 91312 COVID-19 VACCINE BIVALENT BOOSTER 12+ (PFIZER): CPT | Performed by: FAMILY MEDICINE

## 2022-12-07 PROCEDURE — 0124A COVID-19 VACCINE BIVALENT BOOSTER 12+ (PFIZER): CPT | Performed by: FAMILY MEDICINE

## 2022-12-07 ASSESSMENT — PAIN SCALES - GENERAL: PAINLEVEL: NO PAIN (0)

## 2022-12-07 NOTE — PROGRESS NOTES
Patient feels well today.  She denies leakage of fluid or bleeding, reports good fetal movement.  She has had an uncomplicated pregnancy up to this point.  She plans to see Dr. Freitas for her next visit, then follow Dr. Lance to her new practice when able.  She was not prepared to complete her 1 hour GCT today, so asked her to plan to do this at her next visit.  COVID booster #4 given today.  Concerns: None  Doing well.  No concerns today.  No vaginal bleeding, no contractions, no leakage of fluid  No nausea/vomiting. No heartburn  No vaginal discharge. No dysuria.   No headache, vision changes, lower extremity swelling, upper abdominal pain, chest pain, shortness of breath  Reportable signs and symptoms discussed.  Discussed PTL, PROM, and when to call or come in.  Normal anatomy ultrasound.  RTC 4 weeks.  GTT and labs next visit      Citlali Marquez MD

## 2022-12-07 NOTE — PATIENT INSTRUCTIONS
Plan to do your 1 hour glucose test next visit.  You can either come early or stay late in relation to your visit with Dr. Freitas.  Try not to eat within 1 hour of the test, but eat something prior.

## 2022-12-08 ENCOUNTER — TELEPHONE (OUTPATIENT)
Dept: FAMILY MEDICINE | Facility: CLINIC | Age: 32
End: 2022-12-08

## 2022-12-08 NOTE — TELEPHONE ENCOUNTER
[facility] called to check on fax sent on 12/6 requesting medical records from 12/6/2019 - 12/6/2022.  Ref #[id number]    They will resend the request. I provided phone and fax numbers for medical records dept and informed the caller that the clinic could provide records for a few visits but would be unable to provide three years of records. Caller verbalized understanding.

## 2023-01-05 ENCOUNTER — PRENATAL OFFICE VISIT (OUTPATIENT)
Dept: FAMILY MEDICINE | Facility: CLINIC | Age: 33
End: 2023-01-05
Payer: COMMERCIAL

## 2023-01-05 VITALS
BODY MASS INDEX: 24.3 KG/M2 | OXYGEN SATURATION: 98 % | RESPIRATION RATE: 18 BRPM | WEIGHT: 153 LBS | SYSTOLIC BLOOD PRESSURE: 122 MMHG | DIASTOLIC BLOOD PRESSURE: 72 MMHG | HEART RATE: 76 BPM

## 2023-01-05 DIAGNOSIS — Z34.80 SUPERVISION OF OTHER NORMAL PREGNANCY, ANTEPARTUM: Primary | ICD-10-CM

## 2023-01-05 LAB
ERYTHROCYTE [DISTWIDTH] IN BLOOD BY AUTOMATED COUNT: 12.9 % (ref 10–15)
GLUCOSE 1H P 50 G GLC PO SERPL-MCNC: 86 MG/DL (ref 70–129)
HCT VFR BLD AUTO: 33.7 % (ref 35–47)
HGB BLD-MCNC: 11.2 G/DL (ref 11.7–15.7)
MCH RBC QN AUTO: 29.7 PG (ref 26.5–33)
MCHC RBC AUTO-ENTMCNC: 33.2 G/DL (ref 31.5–36.5)
MCV RBC AUTO: 89 FL (ref 78–100)
PLATELET # BLD AUTO: 169 10E3/UL (ref 150–450)
RBC # BLD AUTO: 3.77 10E6/UL (ref 3.8–5.2)
WBC # BLD AUTO: 9.1 10E3/UL (ref 4–11)

## 2023-01-05 PROCEDURE — 82950 GLUCOSE TEST: CPT | Performed by: FAMILY MEDICINE

## 2023-01-05 PROCEDURE — 90715 TDAP VACCINE 7 YRS/> IM: CPT | Performed by: FAMILY MEDICINE

## 2023-01-05 PROCEDURE — 86803 HEPATITIS C AB TEST: CPT | Performed by: FAMILY MEDICINE

## 2023-01-05 PROCEDURE — 85027 COMPLETE CBC AUTOMATED: CPT | Performed by: FAMILY MEDICINE

## 2023-01-05 PROCEDURE — 36415 COLL VENOUS BLD VENIPUNCTURE: CPT | Performed by: FAMILY MEDICINE

## 2023-01-05 PROCEDURE — 86780 TREPONEMA PALLIDUM: CPT | Performed by: FAMILY MEDICINE

## 2023-01-05 PROCEDURE — 90471 IMMUNIZATION ADMIN: CPT | Performed by: FAMILY MEDICINE

## 2023-01-05 PROCEDURE — 99207 PR PRENATAL VISIT: CPT | Performed by: FAMILY MEDICINE

## 2023-01-05 ASSESSMENT — PAIN SCALES - GENERAL: PAINLEVEL: NO PAIN (0)

## 2023-01-05 NOTE — PROGRESS NOTES
Clinic Note - Return OB Visit    Iwona Hill is a 32 year old  female who presents to clinic for a follow up OB visit. She is currently 28w0d with an estimated date of delivery of Mar 29, 2023 via  [facility]. She denies headache, chest pain, shortness of breath, abdominal pain/contractions, vaginal bleeding, or abnormal discharge. She has felt baby move.     New concerns today:   -overall doing well    OB History    Para Term  AB Living   2 1 1 0 0 1   SAB IAB Ectopic Multiple Live Births   0 0 0 0 1      # Outcome Date GA Lbr Jaime/2nd Weight Sex Delivery Anes PTL Lv   2 Current            1 Term / 40w3d 03:08 / 01:14 3.2 kg (7 lb 0.9 oz) F Vag-Spont None N ASAF      Name: JAYDEN,FEMALE-IWONA      Apgar1: 8  Apgar5: 9       Physical exam:  /72   Pulse 76   Resp 18   Wt 69.4 kg (153 lb)   LMP 2022   SpO2 98%   BMI 24.30 kg/m      General: alert, female in no acute distress  Abdomen: gravid uterus at 26 cm above pubic symphysis, non-tender, FHTs 130  Extremities: no edema    Assessment/Plan:  Supervision of other normal pregnancy, antepartum  G2, P1 at 28+1 weeks today.  Uncomplicated pregnancy labs as below.  Tdap given today.  Patient plans to follow with Dr. Lance at her new clinic.  - Glucose tolerance, gest screen, 1 hour  - CBC with platelets  - Treponema Abs w Reflex to RPR and Titer  - Hepatitis C antibody  - TDAP VACCINE (Adacel, Boostrix)  [[id number]]       Information given on gestational diabetes. 1 hour glucose tolerance test today, in addition to CBC and syphilis and hep C screen.   TDAP immunization given.  Blood type A pos. Rhogam not indicated.  Discussed signs and symptoms of  labor.     Follow up in 4 weeks for routine prenatal visit.     Sandee Freitas MD  [facility]

## 2023-01-05 NOTE — PATIENT INSTRUCTIONS
Phone Numbers:  [facility]: [phone]  [facility]: [phone]     When to call or come in to the hospital  If you notice a decrease in your baby's movement.   If your begin to experience contractions that are 5 minutes or less apart and lasting for over 45 seconds, or if contractions are becoming more painful.  If you have any bleeding or leakage of fluids.   If you have a headache not resolved with tylenol, right upper abdominal pain, or sudden onset of swelling.  You know your body best. Never hesitate to call or go to the hospital if something doesn't feel right!    [employer] parenting classes [email or web address]/  [place] parenting classes [email or web address]-[email or web address]/[email or web address]-delivery/[email or web address]-delivery-[email or web address]-information  Free online classes through [employer]    Blood Glucose Screening During Pregnancy   Gestational diabetes is diabetes that only pregnant women get. Changes in your body during pregnancy can cause high blood sugar (glucose). This can cause problems for you and your baby. It is a serious condition, but it can be controlled.  What to Know If You Test Positive   Gestational diabetes is treatable. The best way to control gestational diabetes is to find out you have it as early as possible and start treatment quickly.   Gestational diabetes can cause problems for the mother during pregnancy. It can also cause problems with the baby during pregnancy, delivery, and after. Treatment greatly lowers the chance for problems.   The changes in your body that cause gestational diabetes normally occur only when you are pregnant. After the baby is born, your body goes back to normal and the condition goes away. You may be more likely to have type 2 diabetes later, though. So talk to your doctor about ways to help prevent type 2 diabetes.  Treating Gestational Diabetes   You ll need to check your blood sugar regularly. You can do this at home by pricking your finger and  checking a drop of blood on a glucose monitor. Your health care provider will show you how and when to check your blood sugar and discuss your target blood sugar level.   To manage your blood sugar, you will be given a special plan. It will likely involve planning your meals and getting regular exercise. Some women need to take a hormone called insulin, or an oral hypoglycemic medication to help control their blood sugar.    Making Plans for Feeding Your Baby  By this point, you probably have read a lot about feeding your baby. Breastfeed or formula? Each mother s decision is her own and we respect you and your choices. We ve gathered information on both breastfeeding and formula feeding to help with your decision. Talking with your physician can help in your decision.     Skin-to-skin contact  Being close to mom helps your baby adjust to life outside of the womb. It helps your baby regulate their body temperature, heart rate, and breathing. Your baby will usually be placed skin-to-skin immediately following birth or as soon as possible, if medical intervention is needed.    Rooming-In  Having your baby stay with you in your room is called  rooming-in.  Keeping your baby in your room helps you to learn how to care for your baby by getting to know your baby s cues, body rhythms and sleep cycle.       Cue-based feeding  Cues (signals) are baby s way of telling you what he or she wants. When you learn your infant s cues, you know how to care for and feed your baby. Feeding cues are the licking and smacking of lips, bringing their fist to their mouth, and a reflex called  rooting - where baby turns and opens his or her mouth, searching for the breast or bottle. Crying is a late feeding cue. Babies can feed frequently, often at least 8 times in 24 hours.  Breastfeeding facts  Breast milk is the best source of nutrition for your baby and is available at birth. In the first couple of days, your milk volume is already  starting to increase, though it may not be noticeable. Breastfeeding frequently to increase your milk supply. Within three to five days, you will begin to notice larger milk volumes. An increase in breast size, heaviness and firmness are often described as the milk  coming in.  Frequent breastfeeding can help breasts from getting overly firm and painful. You will know the baby is getting enough milk if your baby is having wet and dirty diapers and gaining weight.   Positioning and attachment   Get comfortable. Use pillows as needed to support your arms and baby. Hold baby close at the level of your breast, facing you in a tummy to tummy position. Skin to skin helps with this. Position the baby with his or her nose by the nipple. There should be a straight line from baby s ear to shoulder to hips.  Tickle your baby s lips or wait for baby to open mouth wide, bring baby to breast by leading with the chin. Aim the nipple at the roof of baby s mouth. A rapid sucking pattern is followed by longer, drawing pattern with occasional swallows heard. When baby is correctly latched, your nipple and much of the areola are pulled well into baby s mouth.      Returning to work or school  Focus on a good start to breastfeeding. Many women continue to provide breastmilk for their baby when they return to work or school. Making plans about where to pump and store milk can make the transition go well. Talk with other mothers who have also returned to work or school for tips and support. Your employer s Human Resource department may be a resource as well.      babies can mean fewer  sick  days for you.  A quality breast pump will also save time and add comfort. Check with your insurance prior to giving birth for breast pump coverage. Many insurance companies include a pump within your benefits.  Breastfeed when you are with your baby. Reserve your bottles of breast milk for when you are away.   Your breasts will need to be   emptied  either by your baby or a pump. Plan to pump at least twice in an eight hour day.  If you cannot pump at work, continue breastfeeding at home. Any amount of breast milk is worth giving to your baby.    Formula feeding facts  If you are planning to use formula to feed your baby, you will want to make some preparations ahead of time. Talk to your doctor about what type of formula to use. Some are iron-fortified, meaning they have extra iron in them. You will want to purchase formula and bottles before your baby is born to be sure you are ready after you return from the hospital. The hospitals do not provide formula samples to take home.    Be sure to follow formula mixing directions closely. Regular milk in the dairy case at the grocery store should not be given to babies under 1 year old. Baby formula is sold in several forms including:  Ready-to-use. This is the most expensive, but no mixing is necessary.  Concentrated liquid. This is less expensive than ready-to-use and you mix with water.  Powder. This is the least expensive. You mix one level scoop of powdered formula with two ounces of water and stir well.    How do I warm my baby s bottles?  You may feed your baby a bottle without warming it first. It is OK for the breast milk or formula to be cool or room temperature. If your baby seems to prefer it warmed, you can put the filled bottle in a container of warm water and let it stand for a few minutes. Check the temperature of the liquid on your skin before feeding it to your baby; to be sure it isn t too hot. Do not heat bottles in the microwave. Microwaves heat food and liquids unevenly, and this can cause hot spots that can burn your baby.    How do I clean and sterilize bottles?  Sterilize bottles and nipples before you use them for the first time. You can do this by putting them in boiling water for 5 minutes. After that first time, you can wash them in hot and soapy water. Rinse them carefully to  be sure there is no soap left on them. You can also wash them in the .

## 2023-01-06 LAB
HCV AB SERPL QL IA: NONREACTIVE
T PALLIDUM AB SER QL: NONREACTIVE

## 2023-01-29 ENCOUNTER — HEALTH MAINTENANCE LETTER (OUTPATIENT)
Age: 33
End: 2023-01-29

## 2023-02-13 ENCOUNTER — HOSPITAL ENCOUNTER (OUTPATIENT)
Facility: CLINIC | Age: 33
End: 2023-02-13
Payer: COMMERCIAL

## 2023-03-06 LAB — GROUP B STREPTOCOCCUS (EXTERNAL): NEGATIVE

## 2023-04-02 ENCOUNTER — HOSPITAL ENCOUNTER (INPATIENT)
Facility: CLINIC | Age: 33
LOS: 1 days | Discharge: HOME | End: 2023-04-03
Attending: ADVANCED PRACTICE MIDWIFE | Admitting: FAMILY MEDICINE
Payer: COMMERCIAL

## 2023-04-02 PROBLEM — Z34.90 PREGNANCY: Status: ACTIVE | Noted: 2023-04-02

## 2023-04-02 PROBLEM — Z36.89 ENCOUNTER FOR TRIAGE IN PREGNANT PATIENT: Status: ACTIVE | Noted: 2023-04-02

## 2023-04-02 LAB
ABO/RH(D): NORMAL
ANTIBODY SCREEN: NEGATIVE
ERYTHROCYTE [DISTWIDTH] IN BLOOD BY AUTOMATED COUNT: 13.9 % (ref 10–15)
HCT VFR BLD AUTO: 41 % (ref 35–47)
HGB BLD-MCNC: 13.1 G/DL (ref 11.7–15.7)
MCH RBC QN AUTO: 28.9 PG (ref 26.5–33)
MCHC RBC AUTO-ENTMCNC: 32 G/DL (ref 31.5–36.5)
MCV RBC AUTO: 90 FL (ref 78–100)
PLATELET # BLD AUTO: 155 10E3/UL (ref 150–450)
RBC # BLD AUTO: 4.54 10E6/UL (ref 3.8–5.2)
SPECIMEN EXPIRATION DATE: NORMAL
WBC # BLD AUTO: 11.4 10E3/UL (ref 4–11)

## 2023-04-02 PROCEDURE — 722N000001 HC LABOR CARE VAGINAL DELIVERY SINGLE

## 2023-04-02 PROCEDURE — 85027 COMPLETE CBC AUTOMATED: CPT | Performed by: FAMILY MEDICINE

## 2023-04-02 PROCEDURE — 250N000011 HC RX IP 250 OP 636: Performed by: FAMILY MEDICINE

## 2023-04-02 PROCEDURE — 86901 BLOOD TYPING SEROLOGIC RH(D): CPT | Performed by: FAMILY MEDICINE

## 2023-04-02 PROCEDURE — 36415 COLL VENOUS BLD VENIPUNCTURE: CPT | Performed by: FAMILY MEDICINE

## 2023-04-02 PROCEDURE — 86780 TREPONEMA PALLIDUM: CPT | Performed by: FAMILY MEDICINE

## 2023-04-02 PROCEDURE — 86850 RBC ANTIBODY SCREEN: CPT | Performed by: FAMILY MEDICINE

## 2023-04-02 PROCEDURE — 120N000001 HC R&B MED SURG/OB

## 2023-04-02 PROCEDURE — 250N000013 HC RX MED GY IP 250 OP 250 PS 637: Performed by: FAMILY MEDICINE

## 2023-04-02 RX ORDER — OXYTOCIN/0.9 % SODIUM CHLORIDE 30/500 ML
100-340 PLASTIC BAG, INJECTION (ML) INTRAVENOUS CONTINUOUS PRN
Status: DISCONTINUED | OUTPATIENT
Start: 2023-04-02 | End: 2023-04-03 | Stop reason: HOSPADM

## 2023-04-02 RX ORDER — HYDROCORTISONE 25 MG/G
CREAM TOPICAL 3 TIMES DAILY PRN
Status: DISCONTINUED | OUTPATIENT
Start: 2023-04-02 | End: 2023-04-03 | Stop reason: HOSPADM

## 2023-04-02 RX ORDER — PROCHLORPERAZINE 25 MG
25 SUPPOSITORY, RECTAL RECTAL EVERY 12 HOURS PRN
Status: DISCONTINUED | OUTPATIENT
Start: 2023-04-02 | End: 2023-04-02 | Stop reason: HOSPADM

## 2023-04-02 RX ORDER — METHYLERGONOVINE MALEATE 0.2 MG/ML
200 INJECTION INTRAVENOUS
Status: DISCONTINUED | OUTPATIENT
Start: 2023-04-02 | End: 2023-04-03 | Stop reason: HOSPADM

## 2023-04-02 RX ORDER — DOCUSATE SODIUM 100 MG/1
100 CAPSULE, LIQUID FILLED ORAL DAILY
Status: DISCONTINUED | OUTPATIENT
Start: 2023-04-02 | End: 2023-04-03 | Stop reason: HOSPADM

## 2023-04-02 RX ORDER — NALOXONE HYDROCHLORIDE 0.4 MG/ML
0.2 INJECTION, SOLUTION INTRAMUSCULAR; INTRAVENOUS; SUBCUTANEOUS
Status: DISCONTINUED | OUTPATIENT
Start: 2023-04-02 | End: 2023-04-02 | Stop reason: HOSPADM

## 2023-04-02 RX ORDER — OXYTOCIN 10 [USP'U]/ML
10 INJECTION, SOLUTION INTRAMUSCULAR; INTRAVENOUS
Status: DISCONTINUED | OUTPATIENT
Start: 2023-04-02 | End: 2023-04-03 | Stop reason: HOSPADM

## 2023-04-02 RX ORDER — MISOPROSTOL 200 UG/1
400 TABLET ORAL
Status: DISCONTINUED | OUTPATIENT
Start: 2023-04-02 | End: 2023-04-03 | Stop reason: HOSPADM

## 2023-04-02 RX ORDER — ACETAMINOPHEN 325 MG/1
650 TABLET ORAL EVERY 4 HOURS PRN
Status: DISCONTINUED | OUTPATIENT
Start: 2023-04-02 | End: 2023-04-02 | Stop reason: HOSPADM

## 2023-04-02 RX ORDER — MISOPROSTOL 200 UG/1
400 TABLET ORAL
Status: DISCONTINUED | OUTPATIENT
Start: 2023-04-02 | End: 2023-04-02 | Stop reason: HOSPADM

## 2023-04-02 RX ORDER — KETOROLAC TROMETHAMINE 30 MG/ML
30 INJECTION, SOLUTION INTRAMUSCULAR; INTRAVENOUS
Status: DISCONTINUED | OUTPATIENT
Start: 2023-04-02 | End: 2023-04-03 | Stop reason: HOSPADM

## 2023-04-02 RX ORDER — MISOPROSTOL 200 UG/1
800 TABLET ORAL
Status: DISCONTINUED | OUTPATIENT
Start: 2023-04-02 | End: 2023-04-03 | Stop reason: HOSPADM

## 2023-04-02 RX ORDER — OXYTOCIN/0.9 % SODIUM CHLORIDE 30/500 ML
340 PLASTIC BAG, INJECTION (ML) INTRAVENOUS CONTINUOUS PRN
Status: DISCONTINUED | OUTPATIENT
Start: 2023-04-02 | End: 2023-04-03 | Stop reason: HOSPADM

## 2023-04-02 RX ORDER — METHYLERGONOVINE MALEATE 0.2 MG/ML
200 INJECTION INTRAVENOUS
Status: DISCONTINUED | OUTPATIENT
Start: 2023-04-02 | End: 2023-04-02 | Stop reason: HOSPADM

## 2023-04-02 RX ORDER — MISOPROSTOL 200 UG/1
800 TABLET ORAL
Status: DISCONTINUED | OUTPATIENT
Start: 2023-04-02 | End: 2023-04-02 | Stop reason: HOSPADM

## 2023-04-02 RX ORDER — OXYTOCIN/0.9 % SODIUM CHLORIDE 30/500 ML
340 PLASTIC BAG, INJECTION (ML) INTRAVENOUS CONTINUOUS PRN
Status: DISCONTINUED | OUTPATIENT
Start: 2023-04-02 | End: 2023-04-02 | Stop reason: HOSPADM

## 2023-04-02 RX ORDER — CITRIC ACID/SODIUM CITRATE 334-500MG
30 SOLUTION, ORAL ORAL
Status: DISCONTINUED | OUTPATIENT
Start: 2023-04-02 | End: 2023-04-02 | Stop reason: HOSPADM

## 2023-04-02 RX ORDER — IBUPROFEN 800 MG/1
800 TABLET, FILM COATED ORAL EVERY 6 HOURS PRN
Status: DISCONTINUED | OUTPATIENT
Start: 2023-04-02 | End: 2023-04-03 | Stop reason: HOSPADM

## 2023-04-02 RX ORDER — METOCLOPRAMIDE HYDROCHLORIDE 5 MG/ML
10 INJECTION INTRAMUSCULAR; INTRAVENOUS EVERY 6 HOURS PRN
Status: DISCONTINUED | OUTPATIENT
Start: 2023-04-02 | End: 2023-04-02 | Stop reason: HOSPADM

## 2023-04-02 RX ORDER — ONDANSETRON 4 MG/1
4 TABLET, ORALLY DISINTEGRATING ORAL EVERY 6 HOURS PRN
Status: DISCONTINUED | OUTPATIENT
Start: 2023-04-02 | End: 2023-04-02 | Stop reason: HOSPADM

## 2023-04-02 RX ORDER — MODIFIED LANOLIN
OINTMENT (GRAM) TOPICAL
Status: DISCONTINUED | OUTPATIENT
Start: 2023-04-02 | End: 2023-04-03 | Stop reason: HOSPADM

## 2023-04-02 RX ORDER — NALOXONE HYDROCHLORIDE 0.4 MG/ML
0.4 INJECTION, SOLUTION INTRAMUSCULAR; INTRAVENOUS; SUBCUTANEOUS
Status: DISCONTINUED | OUTPATIENT
Start: 2023-04-02 | End: 2023-04-02 | Stop reason: HOSPADM

## 2023-04-02 RX ORDER — CARBOPROST TROMETHAMINE 250 UG/ML
250 INJECTION, SOLUTION INTRAMUSCULAR
Status: DISCONTINUED | OUTPATIENT
Start: 2023-04-02 | End: 2023-04-03 | Stop reason: HOSPADM

## 2023-04-02 RX ORDER — BISACODYL 10 MG
10 SUPPOSITORY, RECTAL RECTAL DAILY PRN
Status: DISCONTINUED | OUTPATIENT
Start: 2023-04-02 | End: 2023-04-03 | Stop reason: HOSPADM

## 2023-04-02 RX ORDER — LIDOCAINE 40 MG/G
CREAM TOPICAL
Status: DISCONTINUED | OUTPATIENT
Start: 2023-04-02 | End: 2023-04-02 | Stop reason: HOSPADM

## 2023-04-02 RX ORDER — SODIUM CHLORIDE, SODIUM LACTATE, POTASSIUM CHLORIDE, CALCIUM CHLORIDE 600; 310; 30; 20 MG/100ML; MG/100ML; MG/100ML; MG/100ML
INJECTION, SOLUTION INTRAVENOUS CONTINUOUS
Status: DISCONTINUED | OUTPATIENT
Start: 2023-04-02 | End: 2023-04-02 | Stop reason: HOSPADM

## 2023-04-02 RX ORDER — FENTANYL CITRATE 50 UG/ML
100 INJECTION, SOLUTION INTRAMUSCULAR; INTRAVENOUS
Status: DISCONTINUED | OUTPATIENT
Start: 2023-04-02 | End: 2023-04-02 | Stop reason: HOSPADM

## 2023-04-02 RX ORDER — OXYTOCIN 10 [USP'U]/ML
10 INJECTION, SOLUTION INTRAMUSCULAR; INTRAVENOUS
Status: COMPLETED | OUTPATIENT
Start: 2023-04-02 | End: 2023-04-02

## 2023-04-02 RX ORDER — ONDANSETRON 2 MG/ML
4 INJECTION INTRAMUSCULAR; INTRAVENOUS EVERY 6 HOURS PRN
Status: DISCONTINUED | OUTPATIENT
Start: 2023-04-02 | End: 2023-04-02 | Stop reason: HOSPADM

## 2023-04-02 RX ORDER — ACETAMINOPHEN 325 MG/1
650 TABLET ORAL EVERY 4 HOURS PRN
Status: DISCONTINUED | OUTPATIENT
Start: 2023-04-02 | End: 2023-04-03 | Stop reason: HOSPADM

## 2023-04-02 RX ORDER — PROCHLORPERAZINE MALEATE 10 MG
10 TABLET ORAL EVERY 6 HOURS PRN
Status: DISCONTINUED | OUTPATIENT
Start: 2023-04-02 | End: 2023-04-02 | Stop reason: HOSPADM

## 2023-04-02 RX ORDER — CARBOPROST TROMETHAMINE 250 UG/ML
250 INJECTION, SOLUTION INTRAMUSCULAR
Status: DISCONTINUED | OUTPATIENT
Start: 2023-04-02 | End: 2023-04-02 | Stop reason: HOSPADM

## 2023-04-02 RX ORDER — METOCLOPRAMIDE 10 MG/1
10 TABLET ORAL EVERY 6 HOURS PRN
Status: DISCONTINUED | OUTPATIENT
Start: 2023-04-02 | End: 2023-04-02 | Stop reason: HOSPADM

## 2023-04-02 RX ORDER — IBUPROFEN 800 MG/1
800 TABLET, FILM COATED ORAL
Status: DISCONTINUED | OUTPATIENT
Start: 2023-04-02 | End: 2023-04-03 | Stop reason: HOSPADM

## 2023-04-02 RX ADMIN — DOCUSATE SODIUM 100 MG: 100 CAPSULE, LIQUID FILLED ORAL at 16:24

## 2023-04-02 RX ADMIN — OXYTOCIN 10 UNITS: 10 INJECTION, SOLUTION INTRAMUSCULAR; INTRAVENOUS at 14:35

## 2023-04-02 ASSESSMENT — ACTIVITIES OF DAILY LIVING (ADL)
WEAR_GLASSES_OR_BLIND: NO
DRESSING/BATHING_DIFFICULTY: NO
FALL_HISTORY_WITHIN_LAST_SIX_MONTHS: NO
ADLS_ACUITY_SCORE: 18
ADLS_ACUITY_SCORE: 18
WALKING_OR_CLIMBING_STAIRS_DIFFICULTY: NO
DOING_ERRANDS_INDEPENDENTLY_DIFFICULTY: NO
ADLS_ACUITY_SCORE: 18
ADLS_ACUITY_SCORE: 18
CHANGE_IN_FUNCTIONAL_STATUS_SINCE_ONSET_OF_CURRENT_ILLNESS/INJURY: NO
ADLS_ACUITY_SCORE: 18
CONCENTRATING,_REMEMBERING_OR_MAKING_DECISIONS_DIFFICULTY: NO
ADLS_ACUITY_SCORE: 18
DIFFICULTY_EATING/SWALLOWING: NO
TOILETING_ISSUES: NO
ADLS_ACUITY_SCORE: 18

## 2023-04-02 NOTE — L&D DELIVERY NOTE
OB Vaginal Delivery Note    Iwona Hill MRN# [id number]   Age: 33 year old YOB: 1990       GA: 40w4d  GP:   Labor Complications: None   EBL:   mL  Delivery QBL:    Delivery Type: Vaginal, Spontaneous   ROM to Delivery Time: (Delivered) Minutes: 36  [place] Weight:     1 Minute 5 Minute 10 Minute   Apgar Totals:   8   9           Delivery Details:  Iwona Hill, a 33 year old  female delivered a viable infant with apgars of 8 and 9. Patient was fully dilated and pushing after 7 hours   minutes in active labor. Delivery was via vaginal, spontaneous  to a sterile field under   anesthesia. Infant delivered in vertex  right  occiput  anterior  position. Anterior and posterior shoulders delivered without difficulty. The cord was clamped, cut twice and   were noted. Cord blood was obtained in routine fashion with the following disposition:  .      Cord complications: none   Placenta delivered at  . Placental disposition was Hospital disposal . Fundal massage performed and fundus found to be firm.     Episiotomy: none    Perineum, vagina, cervix were inspected, and the following lacerations were noted:   Perineal lacerations: none                Any lacerations were repaired in the usual fashion using -no lacerations.    Excellent hemostasis was noted. Needle count correct. Infant and patient in delivery room in good and stable condition.        Liz, Female-Iwona [[id number]]    Rupture date/time: 23 1350   Rupture type: Spontaneous Rupture of Membranes  Fluid color: Clear  Fluid odor: Normal     Delivery/Placenta Date and Time    Delivery Date: 23 Delivery Time:  2:26 PM           Cord    Cord Complications: None   Stem cell collection?: No                     Labor Events and Shoulder Dystocia    Fetal Tracing Prior to Delivery: Category 1  Shoulder dystocia present?: Neg     Delivery (Maternal) (Provider to Complete) ([id number])    Episiotomy: None  Perineal  lacerations: None    Repair suture: None  Genital tract inspection done: Pos     Blood Loss  Mother: Iwona Hill #[id number]   Start of Mother's Information    Delivery Blood Loss  04/02/23 0226 - 04/02/23 1456    None           End of Mother's Information  Mother: Iwona Hill #[id number]          Delivery - Provider to Complete ([id number])    Delivery Type (Choose the 1 that will go to the Birth History): Vaginal, Spontaneous                                 Placenta    Removal: Spontaneous  Disposition: Hospital disposal           Presentation and Position    Presentation: Vertex    Position: Right Occiput Anterior                 Shauna Lance MD

## 2023-04-02 NOTE — PROGRESS NOTES
4/2/2023 1426 viable female delivered over intact perineum per Dr Lance. Placenta delivered IM. Pitocin given due to pt desires minimal interventions. Skin to skin done with mother and Father of the baby. .

## 2023-04-03 VITALS
OXYGEN SATURATION: 100 % | TEMPERATURE: 98.3 F | HEIGHT: 67 IN | HEART RATE: 89 BPM | WEIGHT: 168 LBS | DIASTOLIC BLOOD PRESSURE: 75 MMHG | SYSTOLIC BLOOD PRESSURE: 121 MMHG | RESPIRATION RATE: 16 BRPM | BODY MASS INDEX: 26.37 KG/M2

## 2023-04-03 LAB — T PALLIDUM AB SER QL: NONREACTIVE

## 2023-04-03 PROCEDURE — 250N000013 HC RX MED GY IP 250 OP 250 PS 637: Performed by: FAMILY MEDICINE

## 2023-04-03 RX ORDER — IBUPROFEN 600 MG/1
600 TABLET, FILM COATED ORAL EVERY 6 HOURS PRN
Qty: 60 TABLET | Refills: 1 | Status: SHIPPED | OUTPATIENT
Start: 2023-04-03

## 2023-04-03 RX ADMIN — DOCUSATE SODIUM 100 MG: 100 CAPSULE, LIQUID FILLED ORAL at 09:06

## 2023-04-03 RX ADMIN — ACETAMINOPHEN 650 MG: 325 TABLET ORAL at 09:06

## 2023-04-03 ASSESSMENT — ACTIVITIES OF DAILY LIVING (ADL)
ADLS_ACUITY_SCORE: 18

## 2023-04-03 NOTE — LACTATION NOTE
This note was copied from a baby's chart.  Rounded on family for lactation support.    Iwona reported confidence with her breastfeeding success.  Her baby has only 4% weight loss, adequate intake and out put and positive latch scores.    Affirmed Iwona's success.    Encouraged use of education folder for self learning, lactation and community support, indicators to call MD and maternal/family well being.    Questions encouraged and addressed.  Linda Rae RNC, IBCLC

## 2023-04-03 NOTE — PLAN OF CARE
Goal Outcome Evaluation:      Plan of Care Reviewed With: patient    Overall Patient Progress: improvingOverall Patient Progress: improving         All discharge information reviewed with patient. Patient denies questions at this time and is understanding of when, where and who to follow up with.

## 2023-04-03 NOTE — DISCHARGE SUMMARY
Maternal Discharge Summary  [facility]  Date of Service: 4/3/2023    Name      Iwona Hill         1990  MRN       [id number]  PCP        Shauna Elise at [facility] ________________________________________________________________________    Assessment:  Iwona Hill is a 33 year old now  s/p vaginal, spontaneous  at 40w4d on 4/3/2023.       Discharge Plan:   1. Discharge to Home. Condition at Discharge:  stable  2. Physical activity: Regular.  3. Diet:  Regular.  4. Home care nurse: not indicated  5. Lactation clinic appointment: not indicated.  6. Follow up with Shauna Elise in 6 weeks.  No future appointments.   ________________________________________________________________________    Admission Date:  2023  Discharge Date:  4/3/2023  Delivery Date:  2022  Gestational Age at Delivery: 40w4d    Principal Diagnosis: Labor and delivery  Delivery type: Vaginal, Spontaneous   Principal Problem:    Encounter for triage in pregnant patient  Active Problems:    Pregnancy     (normal spontaneous vaginal delivery)      Subjective:  Patient denies SOB, CP, lightheadedness, increased bleeding.      Discharge Exam:  Patient Vitals for the past 24 hrs:   BP Temp Temp src Pulse Resp SpO2 Height Weight   23 0100 129/83 98.2  F (36.8  C) Oral -- 16 -- -- --   23 2045 122/78 98.6  F (37  C) Oral -- 14 -- -- --   23 1655 134/64 -- -- -- -- -- -- --   04/02/23 1640 122/55 -- -- -- -- -- -- --   23 1625 131/64 -- -- -- -- -- -- --   23 1618 128/60 -- -- -- -- -- -- --   23 1617 (!) 139/93 -- -- -- -- -- -- --   23 1555 121/76 -- -- -- -- -- -- --   23 1540 120/76 -- -- -- -- -- -- --   23 1510 122/57 99.2  F (37.3  C) Oral 85 18 -- -- --   23 1455 129/70 -- -- -- -- -- -- --   23 1453 122/71 -- -- -- -- -- -- --   23 1300 130/68 98.8  " F (37.1  C) Axillary 86 17 -- -- --   04/02/23 1008 122/79 -- -- -- -- -- -- --   04/02/23 1007 134/85 98.3  F (36.8  C) Oral 92 16 100 % 1.689 m (5' 6.5\") 76.2 kg (168 lb)     General - alert, comfortable  Heart - RRR, no murmurs  Lungs - CTA bilaterally  Abdomen - fundus firm, nontender, below umbilicus  Extremities - trace edema  Admission on 04/02/2023   Component Date Value Ref Range Status     WBC Count 04/02/2023 11.4 (H)  4.0 - 11.0 10e3/uL Final     RBC Count 04/02/2023 4.54  3.80 - 5.20 10e6/uL Final     Hemoglobin 04/02/2023 13.1  11.7 - 15.7 g/dL Final     Hematocrit 04/02/2023 41.0  35.0 - 47.0 % Final     MCV 04/02/2023 90  78 - 100 fL Final     MCH 04/02/2023 28.9  26.5 - 33.0 pg Final     MCHC 04/02/2023 32.0  31.5 - 36.5 g/dL Final     RDW 04/02/2023 13.9  10.0 - 15.0 % Final     Platelet Count 04/02/2023 155  150 - 450 10e3/uL Final     Group B Streptococcus (External) 03/05/2023 Negative  Negative Final     ABO/RH(D) 04/02/2023 A POS   Final     Antibody Screen 04/02/2023 Negative  Negative Final     SPECIMEN EXPIRATION DATE 04/02/2023 [id number]   Final      Discharge Medications: See medication reconciliation.     Completed by:   Jacklyn Santiago DO  [facility]  4/3/2023 8:50 AM     "

## 2023-04-18 NOTE — H&P
Maternal Admission H&P  [facility]  Date of Admission: 2023  Date of Service: 2023    Name      Iwona Hill         1990  MRN       [id number]  PCP        Shauna Lance at [facility]    ________________________________________________________________________    Assessment and Plan:  33 year old  at 40w4d.    Baby AGA. Anticipate .    Group B strep: neg      ________________________________________________________________________    Chief Complaint: active labor management.    HPI: Iwona Hill is a 33 year old woman at 40w4d, based on an Estimated Date of Delivery: Data Unavailable. She presents with labor    Patient Active Problem List    Diagnosis Date Noted     Encounter for triage in pregnant patient 2023     Priority: Medium     Pregnancy 2023     Priority: Medium      (normal spontaneous vaginal delivery) 2023     Priority: Medium      OB History    Para Term  AB Living   2 2 2 0 0 2   SAB IAB Ectopic Multiple Live Births   0 0 0 0 2      # Outcome Date GA Lbr Jaime/2nd Weight Sex Delivery Anes PTL Lv   2 Term 23 40w4d / 00:16 3.459 kg (7 lb 10 oz) F Vag-Spont None N ASAF      Name: FRANCESCA HILL      Apgar1: 8  Apgar5: 9   1 Term 21 40w3d 03:08 / 01:14 3.2 kg (7 lb 0.9 oz) F Vag-Spont None N ASAF      Name: SUKUMAR HILL-IWONA      Apgar1: 8  Apgar5: 9     Review of Systems Negative except what is noted in HPI.   History reviewed. No pertinent past medical history.   Past Surgical History:   Procedure Laterality Date     HC REMOVAL OF TONSILS,<13 Y/O      Description: Tonsillectomy;  Proc Date: 10/01/2004;   Patient has no known allergies.  Family History   Problem Relation Age of Onset     Hypertension Mother      Parkinsonism Mother      Breast Cancer Maternal Grandmother      Cerebrovascular Disease Maternal  Grandfather      Alzheimer Disease Maternal Grandfather      Heart Disease Paternal Grandmother      Diabetes Type 2  Paternal Grandmother      Cerebrovascular Disease Paternal Uncle      Diabetes Type 2  Paternal Uncle      CABG Paternal Uncle      Psoriasis Father      Social History     Socioeconomic History     Marital status: Single     Spouse name: Not on file     Number of children: Not on file     Years of education: Not on file     Highest education level: Not on file   Occupational History     Not on file   Tobacco Use     Smoking status: Never     Smokeless tobacco: Never   Vaping Use     Vaping status: Never Used   Substance and Sexual Activity     Alcohol use: Not Currently     Drug use: Never     Sexual activity: Yes     Partners: Male     Birth control/protection: None   Other Topics Concern     Not on file   Social History Narrative     Not on file     Social Determinants of Health     Financial Resource Strain: Not on file   Food Insecurity: Not on file   Transportation Needs: Not on file   Physical Activity: Not on file   Stress: Not on file   Social Connections: Not on file   Intimate Partner Violence: Not on file   Housing Stability: Not on file     Prior to Admission Medication List  No medications prior to admission.      Allergies  No Known Allergies  Immunization History   Administered Date(s) Administered     COVID-19 Vaccine 12+ (Pfizer 2022) 03/25/2022     COVID-19 Vaccine 12+ (Pfizer) 08/16/2021, 09/11/2021     COVID-19 Vaccine Bivalent Booster 12+ (Pfizer) 12/07/2022     Flu, Unspecified 12/02/2008     HPV Quadrivalent 07/19/2007, 10/22/2007, 12/02/2008     HepA, Unspecified 07/16/2007, 12/02/2008     Hepatitis A (ADULT 19+) 07/16/2007, 12/02/2008     Influenza (IIV3) PF 12/02/2008     Influenza Vaccine >6 months (Alfuria,Fluzone) 10/01/2019, 11/12/2020, 10/17/2022     Influenza Vaccine, 6+MO IM (QUADRIVALENT W/PRESERVATIVES) 10/14/2016, 12/04/2017, 09/24/2019, 10/12/2021     Influenza  vaccine ages 6-35 months 10/14/2016, 12/04/2017, 09/24/2019, 10/12/2021     Meningococcal ACWY (Menactra ) 07/16/2007, 09/12/2019     TDAP (Adacel,Boostrix) 01/05/2023     TDAP Vaccine (Adacel) 09/12/2019     TDAP Vaccine (Boostrix) 08/24/2010, 01/05/2021     Td (Adult), Adsorbed 07/14/2004     Tdap (Adult) Unspecified Formulation 07/14/2004     Typhoid IM 09/12/2019     Yellow Fever, Live (Stamaril) 09/12/2019     Yellow Fever, unspecified 09/12/2019      Physical Exam  No data found.  Wt Readings from Last 1 Encounters:   04/02/23 76.2 kg (168 lb)   Prepregnancy: 59 kg (130 lb), BMI 20.67. Total gain: 17.2 kg (38 lb) (expected gain: 11.5 kg (25 lb)-16 kg (35 lb)).    HEART: RRR, no murmur  LUNGS: CTA bilaterally  Fetal Heart Tones: Baseline 140 bpm, variability moderate (6-25 bpm), no decelerations. Reactive.  CONTRACTIONS:  regular, every 3-5 minutes.  CERVIX: dilation4 cm.  FLUID: Clear.  Fetal Presentation: vertex.  Labs    Admission on 04/02/2023, Discharged on 04/03/2023   Component Date Value Ref Range Status     Treponema Antibody Total 04/02/2023 Nonreactive  Nonreactive Final     WBC Count 04/02/2023 11.4 (H)  4.0 - 11.0 10e3/uL Final     RBC Count 04/02/2023 4.54  3.80 - 5.20 10e6/uL Final     Hemoglobin 04/02/2023 13.1  11.7 - 15.7 g/dL Final     Hematocrit 04/02/2023 41.0  35.0 - 47.0 % Final     MCV 04/02/2023 90  78 - 100 fL Final     MCH 04/02/2023 28.9  26.5 - 33.0 pg Final     MCHC 04/02/2023 32.0  31.5 - 36.5 g/dL Final     RDW 04/02/2023 13.9  10.0 - 15.0 % Final     Platelet Count 04/02/2023 155  150 - 450 10e3/uL Final     Group B Streptococcus (External) 03/05/2023 Negative  Negative Final     ABO/RH(D) 04/02/2023 A POS   Final     Antibody Screen 04/02/2023 Negative  Negative Final     SPECIMEN EXPIRATION DATE 04/02/2023 [id number]   Final      Group B Strep PCR   Date Value Ref Range Status   02/11/2021 Negative Negative Final      Antibody Screen   Date Value Ref Range Status    04/02/2023 Negative Negative Final     Hepatitis B Surface Antigen   Date Value Ref Range Status   09/08/2022 Nonreactive Nonreactive Final     Chlamydia trachomatis   Date Value Ref Range Status   09/08/2022 Negative Negative Final     Comment:     A negative result by transcription mediated amplification does not preclude the presence of C. trachomatis infection because results are dependent on proper and adequate collection, absence of inhibitors and sufficient rRNA to be detected.     Neisseria gonorrhoeae   Date Value Ref Range Status   08/06/2020 Negative Negative Final     Hemoglobin   Date Value Ref Range Status   04/02/2023 13.1 11.7 - 15.7 g/dL Final        Completed by:   Shauna Lance MD  [facility]  4/18/2023 8:42 AM   used: Not needed.

## 2023-08-31 ENCOUNTER — CLINICAL SUPPORT (OUTPATIENT)
Dept: FAMILY MEDICINE | Facility: CLINIC | Age: 33
End: 2023-08-31
Payer: COMMERCIAL

## 2023-08-31 DIAGNOSIS — Z23 ENCOUNTER FOR VACCINATION: Primary | ICD-10-CM

## 2023-08-31 PROCEDURE — 0124A PFIZER-BIONTECH BIVALENT BOOSTER SARS-COV-2 VACCINE (BOOSTER): CPT | Performed by: FAMILY MEDICINE

## 2023-08-31 PROCEDURE — 91312 PFIZER-BIONTECH BIVALENT BOOSTER SARS-COV-2 VACCINE (BOOSTER): CPT | Performed by: FAMILY MEDICINE

## 2024-02-25 ENCOUNTER — HEALTH MAINTENANCE LETTER (OUTPATIENT)
Age: 34
End: 2024-02-25

## 2025-03-15 ENCOUNTER — HEALTH MAINTENANCE LETTER (OUTPATIENT)
Age: 35
End: 2025-03-15